# Patient Record
Sex: FEMALE | Race: WHITE
[De-identification: names, ages, dates, MRNs, and addresses within clinical notes are randomized per-mention and may not be internally consistent; named-entity substitution may affect disease eponyms.]

---

## 2022-08-04 ENCOUNTER — HOSPITAL ENCOUNTER (INPATIENT)
Dept: HOSPITAL 54 - ER | Age: 77
LOS: 20 days | Discharge: ANOTHER HOSPITAL | DRG: 377 | End: 2022-08-24
Attending: INTERNAL MEDICINE | Admitting: INTERNAL MEDICINE
Payer: COMMERCIAL

## 2022-08-04 VITALS — BODY MASS INDEX: 17.36 KG/M2 | HEIGHT: 66 IN | WEIGHT: 108 LBS

## 2022-08-04 VITALS — SYSTOLIC BLOOD PRESSURE: 108 MMHG | DIASTOLIC BLOOD PRESSURE: 60 MMHG

## 2022-08-04 DIAGNOSIS — D68.9: ICD-10-CM

## 2022-08-04 DIAGNOSIS — K92.2: Primary | ICD-10-CM

## 2022-08-04 DIAGNOSIS — G30.9: ICD-10-CM

## 2022-08-04 DIAGNOSIS — E78.5: ICD-10-CM

## 2022-08-04 DIAGNOSIS — Z53.1: ICD-10-CM

## 2022-08-04 DIAGNOSIS — R78.89: ICD-10-CM

## 2022-08-04 DIAGNOSIS — I27.20: ICD-10-CM

## 2022-08-04 DIAGNOSIS — I95.9: ICD-10-CM

## 2022-08-04 DIAGNOSIS — I10: ICD-10-CM

## 2022-08-04 DIAGNOSIS — N83.9: ICD-10-CM

## 2022-08-04 DIAGNOSIS — I26.99: ICD-10-CM

## 2022-08-04 DIAGNOSIS — Z79.899: ICD-10-CM

## 2022-08-04 DIAGNOSIS — D50.9: ICD-10-CM

## 2022-08-04 DIAGNOSIS — E87.6: ICD-10-CM

## 2022-08-04 DIAGNOSIS — K56.1: ICD-10-CM

## 2022-08-04 DIAGNOSIS — Z20.822: ICD-10-CM

## 2022-08-04 DIAGNOSIS — E88.09: ICD-10-CM

## 2022-08-04 DIAGNOSIS — E43: ICD-10-CM

## 2022-08-04 DIAGNOSIS — D63.8: ICD-10-CM

## 2022-08-04 DIAGNOSIS — F02.80: ICD-10-CM

## 2022-08-04 DIAGNOSIS — I48.20: ICD-10-CM

## 2022-08-04 DIAGNOSIS — N17.0: ICD-10-CM

## 2022-08-04 DIAGNOSIS — E87.0: ICD-10-CM

## 2022-08-04 DIAGNOSIS — E87.5: ICD-10-CM

## 2022-08-04 LAB
ALBUMIN SERPL BCP-MCNC: 2.9 G/DL (ref 3.4–5)
ALP SERPL-CCNC: 102 U/L (ref 46–116)
ALT SERPL W P-5'-P-CCNC: 15 U/L (ref 12–78)
AST SERPL W P-5'-P-CCNC: 11 U/L (ref 15–37)
BASOPHILS # BLD AUTO: 0 K/UL (ref 0–0.2)
BASOPHILS NFR BLD AUTO: 0.5 % (ref 0–2)
BILIRUB DIRECT SERPL-MCNC: 0.2 MG/DL (ref 0–0.2)
BILIRUB SERPL-MCNC: 0.5 MG/DL (ref 0.2–1)
BUN SERPL-MCNC: 36 MG/DL (ref 7–18)
CALCIUM SERPL-MCNC: 7.9 MG/DL (ref 8.5–10.1)
CHLORIDE SERPL-SCNC: 101 MMOL/L (ref 98–107)
CO2 SERPL-SCNC: 27 MMOL/L (ref 21–32)
CREAT SERPL-MCNC: 1.6 MG/DL (ref 0.6–1.3)
EOSINOPHIL NFR BLD AUTO: 0.2 % (ref 0–6)
FERRITIN SERPL-MCNC: 6 NG/ML (ref 8–388)
GLUCOSE SERPL-MCNC: 117 MG/DL (ref 74–106)
HCT VFR BLD AUTO: 16 % (ref 33–45)
HGB BLD-MCNC: 4.7 G/DL (ref 11.5–14.8)
IRON SERPL-MCNC: 21 UG/DL (ref 50–175)
LYMPHOCYTES NFR BLD AUTO: 2 K/UL (ref 0.8–4.8)
LYMPHOCYTES NFR BLD AUTO: 35.1 % (ref 20–44)
LYMPHOCYTES NFR BLD MANUAL: 13 % (ref 16–48)
MCHC RBC AUTO-ENTMCNC: 30 G/DL (ref 31–36)
MCV RBC AUTO: 56 FL (ref 82–100)
MONOCYTES NFR BLD AUTO: 0.4 K/UL (ref 0.1–1.3)
MONOCYTES NFR BLD AUTO: 7 % (ref 2–12)
MONOCYTES NFR BLD MANUAL: 2 % (ref 0–11)
NEUTROPHILS # BLD AUTO: 3.3 K/UL (ref 1.8–8.9)
NEUTROPHILS NFR BLD AUTO: 57.2 % (ref 43–81)
NEUTS SEG NFR BLD MANUAL: 85 % (ref 42–76)
PLATELET # BLD AUTO: 439 K/UL (ref 150–450)
POTASSIUM SERPL-SCNC: 2.3 MMOL/L (ref 3.5–5.1)
PROT SERPL-MCNC: 6.5 G/DL (ref 6.4–8.2)
RBC # BLD AUTO: 2.79 MIL/UL (ref 4–5.2)
SODIUM SERPL-SCNC: 137 MMOL/L (ref 136–145)
TIBC SERPL-MCNC: 307 UG/DL (ref 250–450)
WBC NRBC COR # BLD AUTO: 5.7 K/UL (ref 4.3–11)

## 2022-08-04 PROCEDURE — C9803 HOPD COVID-19 SPEC COLLECT: HCPCS

## 2022-08-04 PROCEDURE — G0378 HOSPITAL OBSERVATION PER HR: HCPCS

## 2022-08-04 RX ADMIN — SODIUM CHLORIDE SCH MLS/HR: 9 INJECTION, SOLUTION INTRAVENOUS at 14:00

## 2022-08-04 NOTE — NUR
RN ADMITTING NOTE



PATIENT ADMITTED FROM ER FOR SEVERE ANEMIA IN -2. PATIENT ON RA AT THIS TIME, 
TOLERATING WELL 100% O2 SAT. PATIENT IS ON TELE MONITOR READING CONTROLLED AFIB 88 BPM. 
PATIENT BROUGHT UP WITH IV FERRLECIT HOOKED ON THE PATIENT. R WRIST 18G PATENT AND INTACT 
WRAPPED IN KERLIX TO PREVENT PATIENT FROM PULLING OUT. PATIENT IS IRRITABLE. FOLLOWS 
COMMANDS, A/O X 1-2. PATIENT IS A POOR HISTORIAN. PER PATIENT, SHE IS Jain. 
SKIN ISSUES DOCUMENTED. BELONGINGS INVENTORIED. ORIENTED PATIENT TO ROOM, RN, AND CNA. 
SAFETY MEASURES IN PLACE: BED LOCKED AND IN LOWEST POSITION, CALL LIGHT WITHIN REACH, SIDE 
RAILS UP. BED ALARM ON. WILL MONITOR PATIENT CLOSELY.

## 2022-08-04 NOTE — NUR
RN NOTE



CALLED BROTHER CLAUDIA JAUREGUI ([phone]) TO OBTAIN ADMISSION INFORMATION. PER BROTHER, HE 
IS THE DPOA AND FOR MEDICAL AS WELL. STATES THAT HE HAS LEGAL DOCUMENTATION THAT HE 
SUBMITTED TO [facility]. CONFIRMED CODE STATUS AS WELL WISHES PATIENT TO BE FULL CODE.

## 2022-08-04 NOTE — NUR
RN NOTE



CALLED [facility] TO INFORM THEM TO FAX NEEDED DOCUMENTS, NO ANSWER. LEFT A VOICEMAIL AND CALL 
BACK NUMBER

## 2022-08-05 VITALS — DIASTOLIC BLOOD PRESSURE: 42 MMHG | SYSTOLIC BLOOD PRESSURE: 103 MMHG

## 2022-08-05 VITALS — DIASTOLIC BLOOD PRESSURE: 50 MMHG | SYSTOLIC BLOOD PRESSURE: 97 MMHG

## 2022-08-05 VITALS — SYSTOLIC BLOOD PRESSURE: 103 MMHG | DIASTOLIC BLOOD PRESSURE: 42 MMHG

## 2022-08-05 VITALS — SYSTOLIC BLOOD PRESSURE: 110 MMHG | DIASTOLIC BLOOD PRESSURE: 52 MMHG

## 2022-08-05 LAB
ALBUMIN SERPL BCP-MCNC: 2.8 G/DL (ref 3.4–5)
ALP SERPL-CCNC: 99 U/L (ref 46–116)
ALT SERPL W P-5'-P-CCNC: 14 U/L (ref 12–78)
AST SERPL W P-5'-P-CCNC: 12 U/L (ref 15–37)
BASOPHILS NFR BLD MANUAL: 0 % (ref 0–2)
BILIRUB SERPL-MCNC: 0.4 MG/DL (ref 0.2–1)
BUN SERPL-MCNC: 33 MG/DL (ref 7–18)
CALCIUM SERPL-MCNC: 8 MG/DL (ref 8.5–10.1)
CHLORIDE SERPL-SCNC: 103 MMOL/L (ref 98–107)
CO2 SERPL-SCNC: 29 MMOL/L (ref 21–32)
CREAT SERPL-MCNC: 1.4 MG/DL (ref 0.6–1.3)
EOSINOPHIL NFR BLD MANUAL: 1 % (ref 0–4)
GLUCOSE SERPL-MCNC: 97 MG/DL (ref 74–106)
HCT VFR BLD AUTO: 15 % (ref 33–45)
HGB BLD-MCNC: 4.5 G/DL (ref 11.5–14.8)
LYMPHOCYTES NFR BLD MANUAL: 23 % (ref 16–48)
MCHC RBC AUTO-ENTMCNC: 30 G/DL (ref 31–36)
MCV RBC AUTO: 56 FL (ref 82–100)
MONOCYTES NFR BLD MANUAL: 6 % (ref 0–11)
NEUTS SEG NFR BLD MANUAL: 70 % (ref 42–76)
PLATELET # BLD AUTO: 412 K/UL (ref 150–450)
POTASSIUM SERPL-SCNC: 2.3 MMOL/L (ref 3.5–5.1)
PROT SERPL-MCNC: 6.2 G/DL (ref 6.4–8.2)
RBC # BLD AUTO: 2.68 MIL/UL (ref 4–5.2)
SODIUM SERPL-SCNC: 140 MMOL/L (ref 136–145)
TSH SERPL DL<=0.005 MIU/L-ACNC: 0.92 UIU/ML (ref 0.36–3.74)
WBC NRBC COR # BLD AUTO: 5.3 K/UL (ref 4.3–11)

## 2022-08-05 RX ADMIN — POTASSIUM CHLORIDE SCH MEQ: 1500 TABLET, EXTENDED RELEASE ORAL at 11:17

## 2022-08-05 RX ADMIN — MEGESTROL ACETATE SCH MG: 400 SUSPENSION ORAL at 09:25

## 2022-08-05 RX ADMIN — Medication SCH MG: at 22:09

## 2022-08-05 RX ADMIN — POTASSIUM CHLORIDE SCH MEQ: 1500 TABLET, EXTENDED RELEASE ORAL at 12:57

## 2022-08-05 RX ADMIN — Medication SCH TAB: at 09:25

## 2022-08-05 RX ADMIN — METOPROLOL SUCCINATE SCH MG: 50 TABLET, EXTENDED RELEASE ORAL at 09:00

## 2022-08-05 RX ADMIN — Medication SCH ML: at 09:33

## 2022-08-05 RX ADMIN — ATORVASTATIN CALCIUM SCH MG: 40 TABLET, FILM COATED ORAL at 22:08

## 2022-08-05 RX ADMIN — POTASSIUM CHLORIDE SCH MEQ: 1500 TABLET, EXTENDED RELEASE ORAL at 20:54

## 2022-08-05 RX ADMIN — SODIUM CHLORIDE SCH MLS/HR: 9 INJECTION, SOLUTION INTRAVENOUS at 13:50

## 2022-08-05 RX ADMIN — POTASSIUM CHLORIDE SCH MEQ: 1500 TABLET, EXTENDED RELEASE ORAL at 16:24

## 2022-08-05 RX ADMIN — MEGESTROL ACETATE SCH MG: 400 SUSPENSION ORAL at 16:24

## 2022-08-05 RX ADMIN — Medication SCH ML: at 16:24

## 2022-08-05 NOTE — NUR
RN CLOSING NOTE



PATIENT IN BED, EYES CLOSED, EASILY AWAKENED. PATIENT IS A/O X 1-2 STILL. NO PAIN OR 
DISCOMFORT AT THIS TIME. TELE MONITOR READS CONTROLLED AFIB 98 BPM. RFA 22G PATENT AND 
INTACT. ORDERED EKG DT PATIENT NOT HAVING INITIAL EKG IN ER. STILL AWAITING CALL BACK FROM 
[facility]. SAFETY MEASURES IMPLEMENTED. NO SIGNIFICANT CHANGES DURING THE SHIFT. ALL NEEDS 
MET AND ATTENDED. ALL ORDERS CARRIED OUT. WILL ENDORSE TO DAY SHIFT NURSE TO [facility].

## 2022-08-05 NOTE — NUR
PATIENT PULLED OUT R WRIST 18 G, INSERTED A NEW IV ACCESS ON R FA 22G PATENT AND INTACT. 
WRAPPED IN KERLIX WELL TO PREVENT PATIENT FROM PULLING OUT PIV

## 2022-08-05 NOTE — NUR
TELE RN NOTES

RECEIVED ON BED A/OX1-2,BREATHING EASY,NO SOB,TELE,UNCONTROLLED AFIB 123,ASYMPTOMATIC,DENIES 
CHEST PAIN,COMMENTED FEELING WEAK,WITH H/H OF 4.5/15 THIS MORNING,NO BLOOD TRANSFUSION DUE 
TO JEHOVAHS WITNESS,ON FERLICCET IV DAILY.WITH SALINE LOCK RFA INTACT AND PATENT.FALL 
PRECAUTION OBSERVED,BED ON LOWEST POSITION AND LOCKED,BED ALAR,CALL LIGHT IN REACH,NEEDS 
ANTICIPATED.

## 2022-08-05 NOTE — NUR
TELE RN NOTES

STILL UNCONTROLLED AFIB THIS TIME, ON STAT EKG,CHARGE NURSE AWARE,MD VILLALBA MADE AWARE 
AWAITING TO CALL BACK

## 2022-08-05 NOTE — NUR
RN NOTE



NOTIFIED IN [facility] THAT EPOGEN 20,000 UNIT DOSE NOT GIVEN AS THERE WAS ONLY 10,000 
UNIT AVAILABLE. VIAL GIVEN BACK TO NURSING SUPERVISOR. PER PHARMACY, IT WILL BE IN MULTIPLE 
5,000 UNIT VIALS AND WILL BE DELIVERED SOON.

## 2022-08-05 NOTE — NUR
TELE RN CLOSING NOTES



PATIENT LAYING IN BED, A/O X 1, TOLERATING WELL ON ROOM AIR WITH NO S/S RESPIRATORY 
DISTRESS. NO COMPLAINTS OF PAIN OR DISCOMFORT AT THIS TIME. TELE MONITOR IN PLACE READING 
A-FIB . RFA # 22 G SL CLEAN, INTACT, AND FLUSHING WELL. SAFETY MEASURES IN PLACE: BED IN 
LOWEST LOCKED POSITION, SIDE RAILS UP X 2, CALL LIGHT WITHIN REACH. ALL NEEDS MET. TURNED 
Q2H DURING SHIFT. WILL ENDORSE TO NIGHT SHIFT FOR MADELYN.

## 2022-08-05 NOTE — NUR
TELE RN OPENING NOTE



PATIENT LAYING IN BED, A/O X 1, TOLERATING WELL ON ROOM AIR WITH NO S/S RESPIRATORY 
DISTRESS. NO COMPLAINTS OF PAIN OR DISCOMFORT AT THIS TIME. TELE MONITOR IN PLACE READING 
A-FIB 80. RFA # 22 G SL CLEAN, INTACT, AND FLUSHING WELL. SAFETY MEASURES IN PLACE: BED IN 
LOWEST LOCKED POSITION, SIDE RAILS UP X 2, CALL LIGHT WITHIN REACH. WILL CONTINUE TO 
MONITOR.

## 2022-08-05 NOTE — NUR
TELE RN NOTES



[facility] (FACILITY PATIENT WAS TRANSFERRED FROM) WAS CALLED AND VOICEMAIL LEFT X 2 
TO REQUEST PREVIOUS MEDICATION LIST. FAX NUMBER AND PHONE NUMBER FOR 3 [facility] WERE LEFT IN 
VOICEMAIL MESSAGES.

## 2022-08-05 NOTE — NUR
TELE RN NOTES

DR VILLALBA CALLED BACK WITH NEW ORDER OF DIGOXIN 0.25MG IV X1 NOTED AND CARRIED OUT.

## 2022-08-06 VITALS — SYSTOLIC BLOOD PRESSURE: 115 MMHG | DIASTOLIC BLOOD PRESSURE: 69 MMHG

## 2022-08-06 VITALS — DIASTOLIC BLOOD PRESSURE: 42 MMHG | SYSTOLIC BLOOD PRESSURE: 97 MMHG

## 2022-08-06 VITALS — DIASTOLIC BLOOD PRESSURE: 48 MMHG | SYSTOLIC BLOOD PRESSURE: 106 MMHG

## 2022-08-06 VITALS — DIASTOLIC BLOOD PRESSURE: 55 MMHG | SYSTOLIC BLOOD PRESSURE: 109 MMHG

## 2022-08-06 VITALS — SYSTOLIC BLOOD PRESSURE: 104 MMHG | DIASTOLIC BLOOD PRESSURE: 76 MMHG

## 2022-08-06 LAB
BASOPHILS NFR BLD MANUAL: 0 % (ref 0–2)
BUN SERPL-MCNC: 21 MG/DL (ref 7–18)
CALCIUM SERPL-MCNC: 8.4 MG/DL (ref 8.5–10.1)
CHLORIDE SERPL-SCNC: 107 MMOL/L (ref 98–107)
CHOLEST SERPL-MCNC: 106 MG/DL (ref ?–200)
CO2 SERPL-SCNC: 21 MMOL/L (ref 21–32)
CREAT SERPL-MCNC: 1 MG/DL (ref 0.6–1.3)
EOSINOPHIL NFR BLD MANUAL: 0 % (ref 0–4)
FERRITIN SERPL-MCNC: 195 NG/ML (ref 8–388)
GLUCOSE SERPL-MCNC: 93 MG/DL (ref 74–106)
HCT VFR BLD AUTO: 16 % (ref 33–45)
HDLC SERPL-MCNC: 42 MG/DL (ref 40–60)
HEMOCCULT STL QL: POSITIVE
HGB BLD-MCNC: 4.6 G/DL (ref 11.5–14.8)
IRON SERPL-MCNC: 110 UG/DL (ref 50–175)
LDLC SERPL DIRECT ASSAY-MCNC: 51 MG/DL (ref 0–99)
LYMPHOCYTES NFR BLD MANUAL: 19 % (ref 16–48)
MCHC RBC AUTO-ENTMCNC: 29 G/DL (ref 31–36)
MCV RBC AUTO: 60 FL (ref 82–100)
MONOCYTES NFR BLD MANUAL: 6 % (ref 0–11)
NEUTS SEG NFR BLD MANUAL: 75 % (ref 42–76)
PLATELET # BLD AUTO: 402 K/UL (ref 150–450)
POTASSIUM SERPL-SCNC: 5.6 MMOL/L (ref 3.5–5.1)
RBC # BLD AUTO: 2.64 MIL/UL (ref 4–5.2)
SODIUM SERPL-SCNC: 137 MMOL/L (ref 136–145)
TIBC SERPL-MCNC: 298 UG/DL (ref 250–450)
TRIGL SERPL-MCNC: 46 MG/DL (ref 30–150)
TSH SERPL DL<=0.005 MIU/L-ACNC: 1.43 UIU/ML (ref 0.36–3.74)
WBC NRBC COR # BLD AUTO: 8.2 K/UL (ref 4.3–11)

## 2022-08-06 RX ADMIN — MEGESTROL ACETATE SCH MG: 400 SUSPENSION ORAL at 17:58

## 2022-08-06 RX ADMIN — Medication SCH ML: at 11:40

## 2022-08-06 RX ADMIN — POTASSIUM CHLORIDE SCH MEQ: 1500 TABLET, EXTENDED RELEASE ORAL at 04:59

## 2022-08-06 RX ADMIN — POTASSIUM CHLORIDE SCH MEQ: 1500 TABLET, EXTENDED RELEASE ORAL at 01:03

## 2022-08-06 RX ADMIN — Medication SCH ML: at 07:33

## 2022-08-06 RX ADMIN — Medication SCH ML: at 17:58

## 2022-08-06 RX ADMIN — POTASSIUM CHLORIDE SCH MEQ: 1500 TABLET, EXTENDED RELEASE ORAL at 09:15

## 2022-08-06 RX ADMIN — SODIUM CHLORIDE SCH MLS/HR: 9 INJECTION, SOLUTION INTRAVENOUS at 13:56

## 2022-08-06 RX ADMIN — ATORVASTATIN CALCIUM SCH MG: 40 TABLET, FILM COATED ORAL at 21:56

## 2022-08-06 RX ADMIN — Medication SCH TAB: at 09:16

## 2022-08-06 RX ADMIN — POTASSIUM CHLORIDE SCH MEQ: 1500 TABLET, EXTENDED RELEASE ORAL at 12:20

## 2022-08-06 RX ADMIN — MEGESTROL ACETATE SCH MG: 400 SUSPENSION ORAL at 09:15

## 2022-08-06 RX ADMIN — METOPROLOL SUCCINATE SCH MG: 50 TABLET, EXTENDED RELEASE ORAL at 09:00

## 2022-08-06 RX ADMIN — Medication SCH ML: at 09:17

## 2022-08-06 RX ADMIN — Medication SCH MG: at 21:57

## 2022-08-06 RX ADMIN — Medication SCH MG: at 09:15

## 2022-08-06 NOTE — NUR
TELE RN NOTES



SPOKE WITH DR MAKENNA FERNANDEZ WHO WAS COVERING FOR DR VILLALBA. DR JACOBS STATED PATIENT DOES 
NOT NEED CARDIAC CONSULT.

## 2022-08-06 NOTE — NUR
TELE RN NOTES

STILL UNCONTROLLED AFIB,RATE ,DENIES ANY CHEST DISCOMFORTS. WILL COME LATER 
FOR MORNING LAB DRAW.MEPILEX APPLIED TO SACRAL AREA.ALL DUE MEDS ADMINISTERED.CALL LIGHT IN 
REACH,NEEDS ATTENDED.

----------------------------------------------------------------

## 2022-08-06 NOTE — NUR
TELE RN CLOSING NOTES



PATIENT LAYING IN BED, A/O X 1, TOLERATING WELL ON ROOM AIR WITH NO S/S RESPIRATORY 
DISTRESS. NO COMPLAINTS OF PAIN OR DISCOMFORT AT THIS TIME. TELE MONITOR IN PLACE READING 
A-FIB. R FA # 22 SL CLEAN, INTACT, AND FLUSHING WELL. SAFETY MEASURES IN PLACE: BED IN 
LOWEST LOCKED POSITION, SIDE RAILS UP X 2, CALL LIGHT WITHIN REACH. ALL NEEDS MET. ATTENDING 
MD WAS MADE AWARE OF PATIENT A-FIB DURING SHIFT, BUT NO CARDIAC CONSULT WAS RECOMMENDED. 
PATIENT TURNED Q2H. WILL ENDORSE TO NIGHT SHIFT FOR MADELYN.

## 2022-08-06 NOTE — NUR
TELE RN OPENING NOTES



PATIENT LAYING IN BED, A/O X 1, TOLERATING WELL ON ROOM AIR WITH NO S/S RESPIRATORY 
DISTRESS. NO COMPLAINTS OF PAIN OR DISCOMFORT AT THIS TIME. TELE MONITOR IN PLACE READING 
A-. R FA # 22 SL CLEAN, INTACT, AND FLUSHING WELL. SAFETY MEASURES IN PLACE: BED IN 
LOWEST LOCKED POSITION, SIDE RAILS UP X 2, CALL LIGHT WITHIN REACH. WILL CONTINUE TO 
MONITOR.

## 2022-08-06 NOTE — NUR
TELE RN NOTES

RECEIVED LAYING COMFORTABLY ON BED,BREATHING EASY,NO SOB,STILL UNCONTROLLED AFIB ON TELE 
MONITOR 114,ON DIGOXIN 0.125 PO DAILY.SALINE LOCK RIGHT FOREARM INTACT AND PATENT.H/H STILL 
LOW AT 4.6/16,POSITIVE FOR OCCULT BLOOD.CALL LIGHT IN REACH,NEEDS ANTICIPATED.

## 2022-08-07 VITALS — DIASTOLIC BLOOD PRESSURE: 55 MMHG | SYSTOLIC BLOOD PRESSURE: 110 MMHG

## 2022-08-07 VITALS — DIASTOLIC BLOOD PRESSURE: 51 MMHG | SYSTOLIC BLOOD PRESSURE: 106 MMHG

## 2022-08-07 VITALS — DIASTOLIC BLOOD PRESSURE: 64 MMHG | SYSTOLIC BLOOD PRESSURE: 118 MMHG

## 2022-08-07 VITALS — SYSTOLIC BLOOD PRESSURE: 92 MMHG | DIASTOLIC BLOOD PRESSURE: 48 MMHG

## 2022-08-07 LAB
ALBUMIN SERPL BCP-MCNC: 2.8 G/DL (ref 3.4–5)
ALP SERPL-CCNC: 101 U/L (ref 46–116)
ALT SERPL W P-5'-P-CCNC: 16 U/L (ref 12–78)
AST SERPL W P-5'-P-CCNC: 14 U/L (ref 15–37)
BASOPHILS # BLD AUTO: 0 K/UL (ref 0–0.2)
BASOPHILS NFR BLD AUTO: 0.5 % (ref 0–2)
BASOPHILS NFR BLD MANUAL: 0 % (ref 0–2)
BILIRUB SERPL-MCNC: 0.5 MG/DL (ref 0.2–1)
BUN SERPL-MCNC: 19 MG/DL (ref 7–18)
CALCIUM SERPL-MCNC: 8.6 MG/DL (ref 8.5–10.1)
CANCER AG125 SERPL-ACNC: 39.1 U/ML (ref 0–38.1)
CANCER AG19-9 SERPL-ACNC: 80 U/ML (ref 0–35)
CHLORIDE SERPL-SCNC: 114 MMOL/L (ref 98–107)
CO2 SERPL-SCNC: 22 MMOL/L (ref 21–32)
CREAT SERPL-MCNC: 1.1 MG/DL (ref 0.6–1.3)
EOSINOPHIL NFR BLD AUTO: 0.3 % (ref 0–6)
EOSINOPHIL NFR BLD MANUAL: 0 % (ref 0–4)
GLUCOSE SERPL-MCNC: 97 MG/DL (ref 74–106)
HCT VFR BLD AUTO: 15 % (ref 33–45)
HGB BLD-MCNC: 4.2 G/DL (ref 11.5–14.8)
LYMPHOCYTES NFR BLD AUTO: 1.3 K/UL (ref 0.8–4.8)
LYMPHOCYTES NFR BLD AUTO: 18.6 % (ref 20–44)
LYMPHOCYTES NFR BLD MANUAL: 22 % (ref 16–48)
MAGNESIUM SERPL-MCNC: 2.7 MG/DL (ref 1.8–2.4)
MCHC RBC AUTO-ENTMCNC: 29 G/DL (ref 31–36)
MCV RBC AUTO: 59 FL (ref 82–100)
MONOCYTES NFR BLD AUTO: 0.7 K/UL (ref 0.1–1.3)
MONOCYTES NFR BLD AUTO: 10.7 % (ref 2–12)
MONOCYTES NFR BLD MANUAL: 6 % (ref 0–11)
NEUTROPHILS # BLD AUTO: 4.9 K/UL (ref 1.8–8.9)
NEUTROPHILS NFR BLD AUTO: 69.9 % (ref 43–81)
NEUTS SEG NFR BLD MANUAL: 72 % (ref 42–76)
PHOSPHATE SERPL-MCNC: 1.5 MG/DL (ref 2.5–4.9)
PLATELET # BLD AUTO: 382 K/UL (ref 150–450)
POTASSIUM SERPL-SCNC: 5.5 MMOL/L (ref 3.5–5.1)
PROT SERPL-MCNC: 6.2 G/DL (ref 6.4–8.2)
RBC # BLD AUTO: 2.52 MIL/UL (ref 4–5.2)
SODIUM SERPL-SCNC: 142 MMOL/L (ref 136–145)
WBC NRBC COR # BLD AUTO: 7 K/UL (ref 4.3–11)

## 2022-08-07 PROCEDURE — 05H933Z INSERTION OF INFUSION DEVICE INTO RIGHT BRACHIAL VEIN, PERCUTANEOUS APPROACH: ICD-10-PCS | Performed by: NURSE PRACTITIONER

## 2022-08-07 RX ADMIN — SODIUM POLYSTYRENE SULFONATE SCH GM: 1 POWDER ORAL; RECTAL at 22:00

## 2022-08-07 RX ADMIN — METOPROLOL SUCCINATE SCH MG: 50 TABLET, EXTENDED RELEASE ORAL at 08:27

## 2022-08-07 RX ADMIN — Medication SCH TAB: at 08:26

## 2022-08-07 RX ADMIN — Medication SCH MG: at 21:28

## 2022-08-07 RX ADMIN — MEGESTROL ACETATE SCH MG: 400 SUSPENSION ORAL at 17:04

## 2022-08-07 RX ADMIN — SODIUM CHLORIDE SCH MLS/HR: 9 INJECTION, SOLUTION INTRAVENOUS at 14:53

## 2022-08-07 RX ADMIN — Medication SCH ML: at 12:06

## 2022-08-07 RX ADMIN — Medication SCH PACKET: at 17:04

## 2022-08-07 RX ADMIN — Medication SCH MG: at 08:27

## 2022-08-07 RX ADMIN — MEGESTROL ACETATE SCH MG: 400 SUSPENSION ORAL at 08:27

## 2022-08-07 RX ADMIN — Medication SCH PACKET: at 09:01

## 2022-08-07 RX ADMIN — Medication SCH ML: at 07:50

## 2022-08-07 RX ADMIN — Medication SCH ML: at 08:27

## 2022-08-07 RX ADMIN — Medication SCH ML: at 17:06

## 2022-08-07 NOTE — NUR
TELE RN NOTES

AFIB 102 THIS TIME.CALM AND QUIET ON BED,MED COMPLIANT,VITAL SIGNS STABLE,IN NO ACUTE 
DISTRESS.

## 2022-08-07 NOTE — NUR
RN NOTE



PT EDUCATED ON LOW HEMOGLOBIN AND HEMATOCRIT AND THE HIGH RISK AND POSSIBLE COMPLICATIONS 
ASSOCIATED WITH REFUSING PRBC TRANSFUSION. PT VERBALIZES UNDERSTANDING AND CONTINUES TO 
REFUSE BLOOD TRANSFUSION. PT VERBALIZES DESIRES TO REMAIN FULL CODE AT THIS TIME.

## 2022-08-07 NOTE — NUR
RN NOTE



IV INFILTRATED. PT HARD STICK, UNABLE TO PLACE NEW ONE. REQUESTED MIDLINE PLACEMENT FROM 
NURSING SUP.

## 2022-08-07 NOTE — NUR
RN NOTE



CRITICAL LAB VALUE OF HEMOGLOBIN 4.2 AND HEMATOCRIT 15 RECEIVED. LAB VALUE EXPECTED DUE TO 
PT REFUSING PRBC TRANSFUSION WITH A HX OF SEVERE ANEMIA.  AWARE OF PT'S REFUSAL OF 
TREATMENT AND OF CRITICAL LAB VALUES OVER PAST FEW DAYS.

## 2022-08-07 NOTE — NUR
TELE RN OPENING NOTE



RECEIVED PT RESTING IN BED. A/O X3 AND ABLE TO MAKE NEEDS KNOWN. PT STABLE ON ROOM AIR. NO 
SOB OR S/S OF RESPIRATORY DISTRESS. BREATHING EVEN AND UNLABORED. ON EXTERNAL CARDIAC 
MONITOR READING UNCONTROLLED AFIB @ 110 BPM. IV ACCESS SHANITA MIDLINE, INTACT AND PATENT, 
RUNNING NS @ 50 ML/HR. SAFETY PRECAUTIONS IN PLACE. BED IN LOWEST LOCKED POSITION, HOB 
ELEVATED, SIDE RAILS UP X3, AND CALL LIGHT AND TABLE WITHIN REACH. ALL NEEDS MET AT THIS 
TIME.

## 2022-08-08 VITALS — SYSTOLIC BLOOD PRESSURE: 108 MMHG | DIASTOLIC BLOOD PRESSURE: 42 MMHG

## 2022-08-08 VITALS — SYSTOLIC BLOOD PRESSURE: 107 MMHG | DIASTOLIC BLOOD PRESSURE: 60 MMHG

## 2022-08-08 VITALS — SYSTOLIC BLOOD PRESSURE: 107 MMHG | DIASTOLIC BLOOD PRESSURE: 61 MMHG

## 2022-08-08 VITALS — SYSTOLIC BLOOD PRESSURE: 97 MMHG | DIASTOLIC BLOOD PRESSURE: 55 MMHG

## 2022-08-08 VITALS — SYSTOLIC BLOOD PRESSURE: 120 MMHG | DIASTOLIC BLOOD PRESSURE: 52 MMHG

## 2022-08-08 VITALS — DIASTOLIC BLOOD PRESSURE: 60 MMHG | SYSTOLIC BLOOD PRESSURE: 115 MMHG

## 2022-08-08 VITALS — DIASTOLIC BLOOD PRESSURE: 62 MMHG | SYSTOLIC BLOOD PRESSURE: 104 MMHG

## 2022-08-08 RX ADMIN — Medication SCH ML: at 09:03

## 2022-08-08 RX ADMIN — Medication SCH MG: at 09:02

## 2022-08-08 RX ADMIN — Medication SCH ML: at 12:09

## 2022-08-08 RX ADMIN — SODIUM CHLORIDE SCH MLS/HR: 9 INJECTION, SOLUTION INTRAVENOUS at 14:34

## 2022-08-08 RX ADMIN — MEGESTROL ACETATE SCH MG: 400 SUSPENSION ORAL at 09:01

## 2022-08-08 RX ADMIN — METOPROLOL SUCCINATE SCH MG: 50 TABLET, EXTENDED RELEASE ORAL at 09:00

## 2022-08-08 RX ADMIN — Medication SCH ML: at 17:00

## 2022-08-08 RX ADMIN — MEGESTROL ACETATE SCH MG: 400 SUSPENSION ORAL at 17:29

## 2022-08-08 RX ADMIN — Medication SCH ML: at 11:45

## 2022-08-08 RX ADMIN — Medication SCH MG: at 21:34

## 2022-08-08 RX ADMIN — SODIUM POLYSTYRENE SULFONATE SCH GM: 1 POWDER ORAL; RECTAL at 10:00

## 2022-08-08 RX ADMIN — Medication SCH ML: at 08:02

## 2022-08-08 RX ADMIN — Medication SCH TAB: at 09:01

## 2022-08-08 RX ADMIN — Medication SCH PACKET: at 09:06

## 2022-08-08 RX ADMIN — SODIUM CHLORIDE PRN MLS/HR: 9 INJECTION, SOLUTION INTRAVENOUS at 10:39

## 2022-08-08 RX ADMIN — Medication SCH PACKET: at 17:29

## 2022-08-08 RX ADMIN — Medication SCH ML: at 17:29

## 2022-08-08 NOTE — NUR
TELE RN CLOSING NOTES:



PATIENT  IN BED AWAKE A/O X3. NO SOB OR CARDIAC DISTRESS NOTED. ON ROOM AIR AND TOLERATING 
WELL. ON CARDIAC MONITPOR WITH CURRENT READING OF CONTROLLED AFIB @88BPM. DENIES ANY PAIN AT 
THIS TIME. WITH IV ACCESS ON SHANITA MIDLINE NS @50ML/HR PATENT AND INTACT. ON CLEAR LIQUIDS. 
SAFETY PRECAUTIONS MAINTAINED: BED LOCKED AND IN LOWEST POSITION. SIDE RAILS UP X 2. CALL 
LIGHT IN EASY REACH. KEPT RESTED AND COMFORTABLE. ENDORSED TO NIGHT SHIFT FOR MADELYN.

## 2022-08-08 NOTE — NUR
RN NOTES:



BRIEFLY EXPLAINED TO PT THAT SHE WILL HAVE A PROCEDURE TOMORROW MORNING FOR COLONOSCOPY, SHE 
WILL HAVE CLEAR LIQUIDS AND WILL DRINK A BOWEL PREP AS ORDERED. OBTAINED CONSENT FOR THE 
PROCEDURE.

## 2022-08-08 NOTE — NUR
TELE RN CLOSING NOTE



PT RESTING IN BED. A/O X3 AND ABLE TO MAKE NEEDS KNOWN. PT STABLE ON ROOM AIR. NO SOB OR S/S 
OF RESPIRATORY DISTRESS. BREATHING EVEN AND UNLABORED. ON EXTERNAL CARDIAC MONITOR READING 
CONTROLLED AFIB @ 90 BPM. IV ACCESS SHANITA MIDLINE, INTACT AND PATENT, RUNNING NS @ 50 ML/HR. 
ALL DUE MEDS GIVEN AS ORDERED. TURNED AND REPOSITIONED Q2H. SAFETY PRECAUTIONS IN PLACE AT 
ALL TIMES. BED IN LOWEST LOCKED POSITION, HOB ELEVATED, SIDE RAILS UP X3, AND CALL LIGHT AND 
TABLE WITHIN REACH. ALL NEEDS MET AT THIS TIME AND WILL ENDORSE TO ONCOMING NURSE FOR MADELYN.

## 2022-08-08 NOTE — NUR
RN NOTES:



PATIENTLY ENCOURAGING PATIENT TO DRINK GOLYTLEY (BOWEL PREP). PATIENT ABLE TO DRINK 1 CUP.

## 2022-08-08 NOTE — NUR
TELE RN OPENING NOTES:



RECEIVED PT IN BED ASLEEP EASILY AROUSED WITH STIMULI.A/O X3. NO SOB OR CARDIAC DISTRESS 
NOTED. OM ROOM AIR AND TOLERATING WELL. ON CARDIAC MONITPOR WITH CURRENT READING OF AFIB 
@90BPM. DENIES ANY PAIN AT THIS TIME. WITH IV ACCESS ON SHANITA MIDLINE NS @50ML/HR PATENT AND 
INTACT. SAFETY PRECAUTIONS MAINTAINED: BED LOCKED AND IN LOWEST POSITION. SIDE RAILS UP X 2. 
CALL LIGHT IN EASY REACH. KEPT RESTED AND COMFORTABLE. WILL MONITOR ACCORDINGLY.

## 2022-08-08 NOTE — NUR
TELE RN OPENING NOTES:



PATIENT IN BED AWAKE A/O X3. NO SOB OR CARDIAC DISTRESS NOTED. ON ROOM AIR AND TOLERATING 
WELL. ON CARDIAC MONITOR WITH CURRENT READING OF CONTROLLED AFIB @88BPM. DENIES ANY PAIN AT 
THIS TIME. WITH IV ACCESS ON SHANITA MIDLINE NS @75ML/HR PATENT AND INTACT. ON CLEAR LIQUIDS. 
SAFETY PRECAUTIONS MAINTAINED PT TO BE NPO AFTER MIDNIGHT FOR COLONOSCOPY.BED LOCKED AND IN 
LOWEST POSITION. SIDE RAILS UP X 2. CALL LIGHT IN EASY REACH. KEPT RESTED AND 
COMFORTABLE.WILL CONTINUE TO MONITOR.

## 2022-08-09 VITALS — DIASTOLIC BLOOD PRESSURE: 57 MMHG | SYSTOLIC BLOOD PRESSURE: 112 MMHG

## 2022-08-09 VITALS — SYSTOLIC BLOOD PRESSURE: 112 MMHG | DIASTOLIC BLOOD PRESSURE: 58 MMHG

## 2022-08-09 VITALS — SYSTOLIC BLOOD PRESSURE: 115 MMHG | DIASTOLIC BLOOD PRESSURE: 54 MMHG

## 2022-08-09 VITALS — DIASTOLIC BLOOD PRESSURE: 59 MMHG | SYSTOLIC BLOOD PRESSURE: 115 MMHG

## 2022-08-09 VITALS — SYSTOLIC BLOOD PRESSURE: 109 MMHG | DIASTOLIC BLOOD PRESSURE: 57 MMHG

## 2022-08-09 VITALS — SYSTOLIC BLOOD PRESSURE: 113 MMHG | DIASTOLIC BLOOD PRESSURE: 58 MMHG

## 2022-08-09 LAB
BUN SERPL-MCNC: 13 MG/DL (ref 7–18)
CALCIUM SERPL-MCNC: 8.5 MG/DL (ref 8.5–10.1)
CHLORIDE SERPL-SCNC: 118 MMOL/L (ref 98–107)
CO2 SERPL-SCNC: 19 MMOL/L (ref 21–32)
CREAT SERPL-MCNC: 0.9 MG/DL (ref 0.6–1.3)
GLUCOSE SERPL-MCNC: 80 MG/DL (ref 74–106)
HCT VFR BLD AUTO: 16 % (ref 33–45)
HGB BLD-MCNC: 4.7 G/DL (ref 11.5–14.8)
LYMPHOCYTES NFR BLD MANUAL: 16 % (ref 16–48)
MCHC RBC AUTO-ENTMCNC: 29 G/DL (ref 31–36)
MCV RBC AUTO: 63 FL (ref 82–100)
MONOCYTES NFR BLD MANUAL: 5 % (ref 0–11)
NEUTS BAND NFR BLD MANUAL: 1 % (ref 0–5)
NEUTS SEG NFR BLD MANUAL: 78 % (ref 42–76)
PHOSPHATE SERPL-MCNC: 3.4 MG/DL (ref 2.5–4.9)
PLATELET # BLD AUTO: 364 K/UL (ref 150–450)
POTASSIUM SERPL-SCNC: 3.9 MMOL/L (ref 3.5–5.1)
RBC # BLD AUTO: 2.54 MIL/UL (ref 4–5.2)
SODIUM SERPL-SCNC: 151 MMOL/L (ref 136–145)
WBC NRBC COR # BLD AUTO: 7.3 K/UL (ref 4.3–11)

## 2022-08-09 RX ADMIN — METOPROLOL SUCCINATE SCH MG: 50 TABLET, EXTENDED RELEASE ORAL at 09:00

## 2022-08-09 RX ADMIN — MEGESTROL ACETATE SCH MG: 400 SUSPENSION ORAL at 09:50

## 2022-08-09 RX ADMIN — Medication SCH MG: at 21:05

## 2022-08-09 RX ADMIN — Medication SCH PACKET: at 09:00

## 2022-08-09 RX ADMIN — EPOETIN ALFA-EPBX SCH UNIT: 10000 INJECTION, SOLUTION INTRAVENOUS; SUBCUTANEOUS at 14:13

## 2022-08-09 RX ADMIN — Medication SCH MG: at 09:00

## 2022-08-09 RX ADMIN — SODIUM POLYSTYRENE SULFONATE SCH GM: 1 POWDER ORAL; RECTAL at 09:00

## 2022-08-09 RX ADMIN — Medication SCH TAB: at 09:00

## 2022-08-09 RX ADMIN — Medication SCH ML: at 08:00

## 2022-08-09 RX ADMIN — MEGESTROL ACETATE SCH MG: 400 SUSPENSION ORAL at 17:40

## 2022-08-09 RX ADMIN — Medication SCH ML: at 11:52

## 2022-08-09 RX ADMIN — Medication SCH ML: at 09:00

## 2022-08-09 RX ADMIN — Medication SCH PACKET: at 17:40

## 2022-08-09 RX ADMIN — Medication SCH ML: at 17:40

## 2022-08-09 NOTE — NUR
RN NOTES:

RECEIEVD AWAKE ON BED, PALE LOOKING, LOOKS WEAK, DRY LIPS AND ON TELE MONITOR A FIB RATE-91, 
A/OX1-2 WITH PERIODS OF CONFUSION, ORIENTED TO UNIT AND STAFF, NON LABORED BREATHING, ON 
ROOM AIR, INCONTINENT B/B, CONTINUE ON BOWEL PREPARATION FOR POSSIBOLE ENDOSCOPY, PER RN 
VERY POOR COMPLIANCE WITH ORAL INFUSION OF GOLITELY, , ON BED REST, SKIN INTACT, SHANITA MID 
LINE WITH IVF ON NS AT 50 ML/HR,.

-SAFETY AND ASPIRATION PRECAUTION OBSERVE.

-LATEST HG-4.7, NO BT SECONDARY TO [employer].

## 2022-08-09 NOTE — NUR
RN NOTES:



VISITOR AT BED SIDE AND WE ENCOURAGED PT TO DRINK THE BOWEL PREP X 3 AND PT STRONGLY 
REFUSED.

## 2022-08-09 NOTE — NUR
TELE RN CLOSING NOTES:



PATIENT IN BED .A/O X3. NO SOB OR CARDIAC DISTRESS NOTED. ON ROOM AIR AND TOLERATING WELL. 
ON CARDIAC MONITOR WITH CURRENT READING OF CONTROLLED AFIB WITH PVCs @ 96 BPM. DENIES ANY 
PAIN AT THIS TIME. WITH IV ACCESS ON SHANITA MIDLINE NS @50ML/HR PATENT AND INTACT. ON CLEAR 
LIQUID AS ORDERED.SAFETY PRECAUTIONS MAINTAINED: BED LOCKED AND IN LOWEST POSITION. SIDE 
RAILS UP X 2. CALL LIGHT IN EASY REACH. KEPT RESTED AND COMFORTABLE. ENDORSED TO NIGHT SHIFT 
NURSE FOR MADELYN.

## 2022-08-09 NOTE — NUR
RN NOTES:



DR VILLALBA MADE AWARE THAT PT IS NOT COMPLIANT ON DRINKING HER BOWEL PREP. GOT AN ORDER OF NGT 
INSERTION FROM DR VILLALBA. PATIENT WAS INFORMED AND VERBALLY  CONSENTED US TO DO NGT INSERTION.

## 2022-08-09 NOTE — NUR
RN NOTES:



RNS NATASHA AND THEO INITIATE DTO INSERT NGT BUT PT REFUSED TO GET NGT. DR VILLALBA MADE AWARE 
AND SAID OKAY AND PAGED DR GRANADOS, AWAITING FOR CALL BACK,.

## 2022-08-09 NOTE — NUR
RN NOTES:

CHECKED AT FREQUEST INTERVALS AND GIVEN HER DRINK, SHE WILL ONLY TAKE A SMALL SIPS, THEN SHE 
REFUSED, MEDICATION COMPLIANT BUT NOT ON HER ORAL BOWEL PREP, IVF CONTINUE.NEEDS ATTENDED.

## 2022-08-09 NOTE — NUR
TELE RN OPENING NOTES:



RECEIVED PT IN BED ASLEEP EASILY AROUSED WITH STIMULI.A/O X3. NO SOB OR CARDIAC DISTRESS 
NOTED. OM ROOM AIR AND TOLERATING WELL. ON CARDIAC MONITOR WITH CURRENT READING OF 
CONTROLLED AFIB @88 BPM. DENIES ANY PAIN AT THIS TIME. WITH IV ACCESS ON SHANITA MIDLINE NS 
@50ML/HR PATENT AND INTACT. ON NPO AS ORDERED.SAFETY PRECAUTIONS MAINTAINED: BED LOCKED AND 
IN LOWEST POSITION. SIDE RAILS UP X 2. CALL LIGHT IN EASY REACH. KEPT RESTED AND 
COMFORTABLE. WILL MONITOR ACCORDINGLY.

## 2022-08-09 NOTE — NUR
TELE RN CLOSING NOTES:



PATIENT IN BED AWAKE A/O X3. NO SOB OR CARDIAC DISTRESS NOTED. ON ROOM AIR AND TOLERATING 
WELL. ON CARDIAC MONITOR WITH CURRENT READING OF CONTROLLED AFIB. DENIES ANY PAIN AT THIS 
TIME. WITH IV ACCESS ON SHANITA MIDLINE NS @75ML/HR PATENT AND INTACT. SAFETY PRECAUTIONS 
MAINTAINED PT NPO FOR COLONOSCOPY HOWEVER PT REFUSED TO TAKE PREP MULTIPLE TIMES THROUGHOUT 
THE NIGHT .BED LOCKED AND IN LOWEST POSITION. SIDE RAILS UP X 2. CALL LIGHT IN EASY REACH. 
KEPT RESTED AND COMFORTABLE.WILL ENDORSE CARE.

## 2022-08-10 VITALS — DIASTOLIC BLOOD PRESSURE: 69 MMHG | SYSTOLIC BLOOD PRESSURE: 116 MMHG

## 2022-08-10 VITALS — DIASTOLIC BLOOD PRESSURE: 57 MMHG | SYSTOLIC BLOOD PRESSURE: 119 MMHG

## 2022-08-10 VITALS — DIASTOLIC BLOOD PRESSURE: 54 MMHG | SYSTOLIC BLOOD PRESSURE: 110 MMHG

## 2022-08-10 VITALS — SYSTOLIC BLOOD PRESSURE: 121 MMHG | DIASTOLIC BLOOD PRESSURE: 57 MMHG

## 2022-08-10 VITALS — DIASTOLIC BLOOD PRESSURE: 51 MMHG | SYSTOLIC BLOOD PRESSURE: 111 MMHG

## 2022-08-10 VITALS — DIASTOLIC BLOOD PRESSURE: 59 MMHG | SYSTOLIC BLOOD PRESSURE: 123 MMHG

## 2022-08-10 LAB
BUN SERPL-MCNC: 13 MG/DL (ref 7–18)
CALCIUM SERPL-MCNC: 8.3 MG/DL (ref 8.5–10.1)
CHLORIDE SERPL-SCNC: 119 MMOL/L (ref 98–107)
CO2 SERPL-SCNC: 18 MMOL/L (ref 21–32)
CREAT SERPL-MCNC: 1 MG/DL (ref 0.6–1.3)
GLUCOSE SERPL-MCNC: 84 MG/DL (ref 74–106)
HCT VFR BLD AUTO: 15 % (ref 33–45)
HGB BLD-MCNC: 4.4 G/DL (ref 11.5–14.8)
LYMPHOCYTES NFR BLD MANUAL: 12 % (ref 16–48)
MCHC RBC AUTO-ENTMCNC: 29 G/DL (ref 31–36)
MCV RBC AUTO: 64 FL (ref 82–100)
MONOCYTES NFR BLD MANUAL: 5 % (ref 0–11)
NEUTS SEG NFR BLD MANUAL: 83 % (ref 42–76)
PLATELET # BLD AUTO: 352 K/UL (ref 150–450)
POTASSIUM SERPL-SCNC: 3.4 MMOL/L (ref 3.5–5.1)
RBC # BLD AUTO: 2.39 MIL/UL (ref 4–5.2)
SODIUM SERPL-SCNC: 154 MMOL/L (ref 136–145)
WBC NRBC COR # BLD AUTO: 7.5 K/UL (ref 4.3–11)

## 2022-08-10 RX ADMIN — Medication SCH ML: at 08:00

## 2022-08-10 RX ADMIN — EPOETIN ALFA-EPBX SCH UNIT: 10000 INJECTION, SOLUTION INTRAVENOUS; SUBCUTANEOUS at 14:59

## 2022-08-10 RX ADMIN — SODIUM CHLORIDE PRN MLS/HR: 9 INJECTION, SOLUTION INTRAVENOUS at 04:34

## 2022-08-10 RX ADMIN — Medication SCH ML: at 08:23

## 2022-08-10 RX ADMIN — Medication SCH TAB: at 08:23

## 2022-08-10 RX ADMIN — Medication SCH ML: at 11:36

## 2022-08-10 RX ADMIN — Medication SCH ML: at 16:22

## 2022-08-10 RX ADMIN — METOPROLOL SUCCINATE SCH MG: 50 TABLET, EXTENDED RELEASE ORAL at 08:23

## 2022-08-10 RX ADMIN — MEGESTROL ACETATE SCH MG: 400 SUSPENSION ORAL at 08:23

## 2022-08-10 RX ADMIN — Medication SCH PACKET: at 16:27

## 2022-08-10 RX ADMIN — Medication SCH PACKET: at 08:23

## 2022-08-10 RX ADMIN — Medication SCH MG: at 08:22

## 2022-08-10 RX ADMIN — MEGESTROL ACETATE SCH MG: 400 SUSPENSION ORAL at 16:26

## 2022-08-10 RX ADMIN — Medication SCH MG: at 21:46

## 2022-08-10 NOTE — NUR
TELE RN OPENING NOTE



RECEIVED PT ASLEEP IN BED, EASILY AROUSED. A/O X1-2, REORIENTED PT AS NEEDED. ON RA, 
TOLERATING WELL. NO SOB NOTED. NOT IN ANY SIGN OF RESPIRATORY DISTRESS. ON CARDIAC TELE 
MONITOR WITH CURRENT READING OF AFIB CONTROLLED, HR 82. NO C/O CARDIAC DISTRESS VOICED AT 
THIS TIME. IV ACCESS IN SHANITA MIDLINE INTACT, AND PATENT WITH NS INFUSING AT 50ML/HR. SAFETY 
MEASURES IN PLACE: BED IN LOWEST AND LOCKED POSITION, SIDE RAILS UPX2, BED ALARM ON AND CALL 
LIGHT WITHIN REACH. WILL CONTINUE TO MONITOR PT.

## 2022-08-10 NOTE — NUR
RN OPENING NOTE



PATIENT IN BED, AWAKE. PATIENT IS A/O X 1-2 AT THIS TIME. PATIENT IS CURRENTLY ON RA, 
TOLERATING WELL. NO SOB OR REPARATORY DISTRESS NOTED. TELE MONITOR READS AFIB CONTROLLED AT 
79 BPM. PATIENT HAS A R UPPER ARM MIDLINE, PATENT AND INTACT WITH NS AT 50 ML/HR ONGOING. 
PATIENT TO BE NPO AT MIDNIGHT FOR COLONOSCOPY TOMORROW. SAFETY MEASURES IN PLACE, BED LOCKED 
AND IN LOWEST POSITION, CALL LIGHT WITHIN REACH, SIDE RAILS UP. WILL MONITOR PATIENT 
CLOSELY.

## 2022-08-10 NOTE — NUR
TELE RN CLOSING NOTE



PT ASLEEP IN BED, EASILY AROUSED. A/O X1-2, REORIENTED PT AS NEEDED. ON RA, TOLERATING WELL. 
NO SOB NOTED. NOT IN ANY SIGN OF RESPIRATORY DISTRESS. ON CARDIAC TELE MONITOR WITH CURRENT 
READING OF AFIB CONTROLLED, HR 84. NO C/O CARDIAC DISTRESS VOICED AT THIS TIME. IV ACCESS IN 
SHANITA MIDLINE INTACT, AND PATENT WITH NS INFUSING AT 50ML/HR. ALL NEEDS ATTENDED. KEPT CLEAN 
AND COMFORTABLE. SAFETY MEASURES IN PLACE: BED IN LOWEST AND LOCKED POSITION, SIDE RAILS 
UPX2, BED ALARM ON AND CALL LIGHT WITHIN REACH. ENDORSED TO NIGHT SHIFT NURSE FOR MADELYN.

## 2022-08-10 NOTE — NUR
RN NOTE



RECEIVED A CALL FROM DR. GRANADOS WITH ORDERS TO GIVE PT 4 TABS OF DULCOLAX 5MG PO ONCE. AND 
AFTER AN HOUR TO GIVE 238 GM MIRALAX IN 64 OZ WATER ONCE PO THEN NPO AFTER MIDNIGHT.

## 2022-08-10 NOTE — NUR
RN NOTES:

ABLE TO SLEEP AND REST, NO PAIN OR DISCOMFORT NO SIGN OF RESPIRATORY DISTRESS, KEPT 
MONIOTRED, NPO FOR POSSIBLE COLONOSCOPY, TO CALL  IN THE MORNING TO F/U, STILL 
PENDING FOR GYNE F/U REGARDING HER IMAGING RESULTS, TURNING AND REPOSITIONING DONE, OFF 
LOADING DONE, IVF ONGOING, ENDORSED FOR CONTINUITY OF CARE.

-------------------------------------------------------------------------------

Addendum: 08/10/22 at 4624 by RICHIE ORTIZ RN

-------------------------------------------------------------------------------

ADDED NOTES:

ON CARDIAC MONITOR ANABELL WITH PVC RATE-88.

## 2022-08-10 NOTE — NUR
RN DORI



RECEIVED A CALL FROM ERICK OCHOA REPORTING PT'S CRITICAL LAB VALUE OF HEMOGLOBIN 4.4 AND 
HEMATOCRIT 15. CALLED DR. VILLALBA AND MADE HIM AWARE OF CRITICAL LAB WITH NO NEW ORDER AT THIS 
TIME. PER MD HE WILL SEE THE PT. No Residual Tumor Seen Histology Text: There were no malignant cells seen in the sections examined.

## 2022-08-10 NOTE — NUR
RN NOTE



PT REFUSED NEUTRA PHOS PACKET AND MEGACE SUSP SCHEDULED AT 1700. PT AGREED TO TAKE 
MEDICATIONS AT FIRST BUT REFUSED WHEN ABOUT TO ADMINISTER MEDICATION. EXPLAINED RISK AND 
BENEFITS STILL STRONGLY REFUSED.

## 2022-08-11 VITALS — SYSTOLIC BLOOD PRESSURE: 128 MMHG | DIASTOLIC BLOOD PRESSURE: 59 MMHG

## 2022-08-11 VITALS — SYSTOLIC BLOOD PRESSURE: 112 MMHG | DIASTOLIC BLOOD PRESSURE: 54 MMHG

## 2022-08-11 VITALS — DIASTOLIC BLOOD PRESSURE: 52 MMHG | SYSTOLIC BLOOD PRESSURE: 144 MMHG

## 2022-08-11 VITALS — SYSTOLIC BLOOD PRESSURE: 107 MMHG | DIASTOLIC BLOOD PRESSURE: 51 MMHG

## 2022-08-11 VITALS — DIASTOLIC BLOOD PRESSURE: 62 MMHG | SYSTOLIC BLOOD PRESSURE: 112 MMHG

## 2022-08-11 VITALS — DIASTOLIC BLOOD PRESSURE: 64 MMHG | SYSTOLIC BLOOD PRESSURE: 121 MMHG

## 2022-08-11 LAB
BUN SERPL-MCNC: 10 MG/DL (ref 7–18)
CALCIUM SERPL-MCNC: 7.8 MG/DL (ref 8.5–10.1)
CHLORIDE SERPL-SCNC: 119 MMOL/L (ref 98–107)
CO2 SERPL-SCNC: 20 MMOL/L (ref 21–32)
CREAT SERPL-MCNC: 1 MG/DL (ref 0.6–1.3)
GLUCOSE SERPL-MCNC: 106 MG/DL (ref 74–106)
POTASSIUM SERPL-SCNC: 2.6 MMOL/L (ref 3.5–5.1)
SODIUM SERPL-SCNC: 152 MMOL/L (ref 136–145)

## 2022-08-11 PROCEDURE — 05H933Z INSERTION OF INFUSION DEVICE INTO RIGHT BRACHIAL VEIN, PERCUTANEOUS APPROACH: ICD-10-PCS | Performed by: NURSE PRACTITIONER

## 2022-08-11 RX ADMIN — POTASSIUM CHLORIDE SCH MLS/HR: 200 INJECTION, SOLUTION INTRAVENOUS at 10:17

## 2022-08-11 RX ADMIN — MEGESTROL ACETATE SCH MG: 400 SUSPENSION ORAL at 17:00

## 2022-08-11 RX ADMIN — POTASSIUM CHLORIDE SCH MLS/HR: 200 INJECTION, SOLUTION INTRAVENOUS at 14:36

## 2022-08-11 RX ADMIN — Medication SCH TAB: at 09:00

## 2022-08-11 RX ADMIN — SODIUM CHLORIDE, SODIUM LACTATE, POTASSIUM CHLORIDE, AND CALCIUM CHLORIDE SCH MLS/HR: .6; .31; .03; .02 INJECTION, SOLUTION INTRAVENOUS at 09:49

## 2022-08-11 RX ADMIN — MEGESTROL ACETATE SCH MG: 400 SUSPENSION ORAL at 09:00

## 2022-08-11 RX ADMIN — POTASSIUM CHLORIDE SCH MLS/HR: 200 INJECTION, SOLUTION INTRAVENOUS at 15:40

## 2022-08-11 RX ADMIN — POTASSIUM CHLORIDE SCH MLS/HR: 200 INJECTION, SOLUTION INTRAVENOUS at 12:18

## 2022-08-11 RX ADMIN — Medication SCH ML: at 08:00

## 2022-08-11 RX ADMIN — Medication SCH ML: at 12:00

## 2022-08-11 RX ADMIN — SODIUM CHLORIDE, SODIUM LACTATE, POTASSIUM CHLORIDE, AND CALCIUM CHLORIDE SCH MLS/HR: .6; .31; .03; .02 INJECTION, SOLUTION INTRAVENOUS at 22:58

## 2022-08-11 RX ADMIN — Medication SCH PACKET: at 09:00

## 2022-08-11 RX ADMIN — Medication SCH PACKET: at 17:00

## 2022-08-11 RX ADMIN — METOPROLOL SUCCINATE SCH MG: 50 TABLET, EXTENDED RELEASE ORAL at 09:00

## 2022-08-11 RX ADMIN — Medication SCH MG: at 09:00

## 2022-08-11 RX ADMIN — POTASSIUM CHLORIDE SCH MLS/HR: 200 INJECTION, SOLUTION INTRAVENOUS at 16:52

## 2022-08-11 RX ADMIN — POTASSIUM CHLORIDE SCH MLS/HR: 200 INJECTION, SOLUTION INTRAVENOUS at 09:15

## 2022-08-11 RX ADMIN — Medication SCH ML: at 09:00

## 2022-08-11 RX ADMIN — POTASSIUM CHLORIDE SCH MLS/HR: 200 INJECTION, SOLUTION INTRAVENOUS at 13:29

## 2022-08-11 RX ADMIN — Medication SCH ML: at 17:00

## 2022-08-11 RX ADMIN — Medication SCH MG: at 22:00

## 2022-08-11 RX ADMIN — POTASSIUM CHLORIDE SCH MLS/HR: 200 INJECTION, SOLUTION INTRAVENOUS at 08:02

## 2022-08-11 RX ADMIN — POTASSIUM CHLORIDE SCH MLS/HR: 200 INJECTION, SOLUTION INTRAVENOUS at 11:17

## 2022-08-11 RX ADMIN — POTASSIUM CHLORIDE SCH MLS/HR: 200 INJECTION, SOLUTION INTRAVENOUS at 17:56

## 2022-08-11 NOTE — NUR
RN NOTE



RECEIVED A CALL FROM DR. LIZ, ANESTHESIOLOGIST. TO RECHECK POTASSIUM LEVEL TODAY AT 1630.

## 2022-08-11 NOTE — NUR
RN CLOSING NOTE



PATIENT IN BED, AWAKE. PATIENT IS A/O X 1-2 AT THIS TIME. PATIENT IS CURRENTLY ON RA, 
TOLERATING WELL. NO SOB OR REPARATORY DISTRESS NOTED. TELE MONITOR READS AFIB CONTROLLED AT 
80 BPM. PATIENT HAS A R FA 22G, PATENT AND INTACT WITH D5 1/2NS  ML/HR ONGOING. 
PATIENT NPO AT THIS TIME. SAFETY MEASURES IN PLACE, BED LOCKED AND IN LOWEST POSITION, CALL 
LIGHT WITHIN REACH, SIDE RAILS UP. ALL NEEDS MET AND ATTENDED. ALL ORDERS CARRIED OUT. WILL 
ENDORSE TO DAY SHIFT NURSE FOR MADELYN.

## 2022-08-11 NOTE — NUR
RN OPENING NOTE



RECEIVED PATIENT IN BED, A/OX2. NO S/S OF APPARENT DISTRESS IN ROOM AIR. DENIES PAIN NOR 
DISCOMFORT AT THIS TIME. READING A-FIB CONTROLLED RATE 72 BPM. PATIENT R.UA MIDLINE RUNNING 
POTASSIUM CHL. 20MEQ IN D5W @75MLS/HR. PATIENT IS CURRENTLY NPO AND FOR COLONOSCOPY 
SUPPOSEDLY TONIGHT, WILL CONTINUE WITH PLAN OF CARE FOR PATIENT. SAFETY IN PLACE AND 
RE-ORIENTED AND  ENCOURAGED WITH THE USE OF CALL LIGHT.

## 2022-08-11 NOTE — NUR
TELE RN CLOSING NOTE



PT ASLEEP IN BED, EASILY AROUSED. A/O X1, REORIENTED PT AS NEEDED. ON RA, TOLERATING WELL. 
NO SOB NOTED. NOT IN ANY SIGN OF RESPIRATORY DISTRESS. ON CARDIAC TELE MONITOR WITH CURRENT 
READING OF AFIB CONTROLLED, HR 82. NO C/O CARDIAC DISTRESS VOICED AT THIS TIME. IV ACCESS IN 
SHANITA MIDLINE G #18 INTACT, AND PATENT WITH POTASSIUM CHLORIDE IV IN D5W INFUSING AT 75ML/HR. 
ALL NEEDS ATTENDED. KEPT CLEAN AND COMFORTABLE. SAFETY MEASURES IN PLACE: BED IN LOWEST AND 
LOCKED POSITION, SIDE RAILS UPX2, BED ALARM ON AND CALL LIGHT WITHIN REACH. WILL ENDORSED TO 
NIGHT SHIFT NURSE FOR MADELYN.

## 2022-08-11 NOTE — NUR
TELE RN OPENING NOTE



RECEIVED PT ASLEEP IN BED, EASILY AROUSED. A/O X1, REORIENTED PT AS NEEDED. ON RA, 
TOLERATING WELL. NO SOB NOTED. NOT IN ANY SIGN OF RESPIRATORY DISTRESS. ON CARDIAC TELE 
MONITOR WITH CURRENT READING OF AFIB CONTROLLED, HR 78. NO C/O CARDIAC DISTRESS VOICED AT 
THIS TIME. IV ACCESS IN SHANITA MIDLINE INTACT, AND PATENT WITH NS INFUSING AT 50ML/HR. SAFETY 
MEASURES IN PLACE: BED IN LOWEST AND LOCKED POSITION, SIDE RAILS UPX2, BED ALARM ON AND CALL 
LIGHT WITHIN REACH. WILL CONTINUE TO MONITOR PT.

## 2022-08-11 NOTE — NUR
RFA 22G IV ACCESS ESTABLISHED. PATENT AND INTACT, WRAPPED IN KERLIX TO PREVENT PATIENT FROM 
REMOVING IV LINE

## 2022-08-12 VITALS — SYSTOLIC BLOOD PRESSURE: 118 MMHG | DIASTOLIC BLOOD PRESSURE: 53 MMHG

## 2022-08-12 VITALS — SYSTOLIC BLOOD PRESSURE: 99 MMHG | DIASTOLIC BLOOD PRESSURE: 54 MMHG

## 2022-08-12 VITALS — DIASTOLIC BLOOD PRESSURE: 59 MMHG | SYSTOLIC BLOOD PRESSURE: 110 MMHG

## 2022-08-12 VITALS — DIASTOLIC BLOOD PRESSURE: 52 MMHG | SYSTOLIC BLOOD PRESSURE: 110 MMHG

## 2022-08-12 LAB
ALBUMIN/GLOB SERPL: 0.9 {RATIO} (ref 0.7–1.7)
ALPHA1 GLOB SERPL ELPH-MCNC: 0.2 G/DL (ref 0–0.4)
ALPHA2 GLOB SERPL ELPH-MCNC: 0.6 G/DL (ref 0.4–1)
B-GLOBULIN SERPL ELPH-MCNC: 1 G/DL (ref 0.7–1.3)
BUN SERPL-MCNC: 7 MG/DL (ref 7–18)
CALCIUM SERPL-MCNC: 7.7 MG/DL (ref 8.5–10.1)
CHLORIDE SERPL-SCNC: 120 MMOL/L (ref 98–107)
CO2 SERPL-SCNC: 21 MMOL/L (ref 21–32)
CREAT SERPL-MCNC: 0.8 MG/DL (ref 0.6–1.3)
EOSINOPHIL NFR BLD MANUAL: 2 % (ref 0–4)
FERRITIN SERPL-MCNC: 212 NG/ML (ref 8–388)
GLUCOSE SERPL-MCNC: 106 MG/DL (ref 74–106)
HCT VFR BLD AUTO: 15 % (ref 33–45)
HGB BLD-MCNC: 4.3 G/DL (ref 11.5–14.8)
IGA SERPL-MCNC: 416 MG/DL (ref 64–422)
IGM SERPL-MCNC: 107 MG/DL (ref 26–217)
IRON SERPL-MCNC: 24 UG/DL (ref 50–175)
LYMPHOCYTES NFR BLD MANUAL: 24 % (ref 16–48)
MCHC RBC AUTO-ENTMCNC: 29 G/DL (ref 31–36)
MCV RBC AUTO: 68 FL (ref 82–100)
MONOCYTES NFR BLD MANUAL: 4 % (ref 0–11)
NEUTS SEG NFR BLD MANUAL: 70 % (ref 42–76)
PLATELET # BLD AUTO: 236 K/UL (ref 150–450)
POTASSIUM SERPL-SCNC: 3.4 MMOL/L (ref 3.5–5.1)
RBC # BLD AUTO: 2.21 MIL/UL (ref 4–5.2)
SODIUM SERPL-SCNC: 149 MMOL/L (ref 136–145)
TIBC SERPL-MCNC: 206 UG/DL (ref 250–450)
WBC NRBC COR # BLD AUTO: 5.7 K/UL (ref 4.3–11)

## 2022-08-12 RX ADMIN — Medication SCH ML: at 09:00

## 2022-08-12 RX ADMIN — Medication SCH ML: at 11:31

## 2022-08-12 RX ADMIN — Medication SCH ML: at 08:00

## 2022-08-12 RX ADMIN — MEGESTROL ACETATE SCH MG: 400 SUSPENSION ORAL at 09:00

## 2022-08-12 RX ADMIN — MEGESTROL ACETATE SCH MG: 400 SUSPENSION ORAL at 17:35

## 2022-08-12 RX ADMIN — Medication SCH TAB: at 09:00

## 2022-08-12 RX ADMIN — Medication SCH MG: at 09:00

## 2022-08-12 RX ADMIN — METOPROLOL SUCCINATE SCH MG: 50 TABLET, EXTENDED RELEASE ORAL at 09:00

## 2022-08-12 RX ADMIN — Medication SCH MG: at 21:39

## 2022-08-12 RX ADMIN — Medication SCH PACKET: at 17:35

## 2022-08-12 RX ADMIN — Medication SCH ML: at 17:35

## 2022-08-12 RX ADMIN — Medication SCH PACKET: at 09:00

## 2022-08-12 NOTE — NUR
RN NOTES



OVER THE PHONE CONSENT PROVIDED BY PATIENTS BROTHER (CLAUDIA), FOR PATIENT TO HAVE CT OF 
ABDOMEN, CHEST AND PELVIS WITH CONTRAST DONE. 2ND RN WITNESS PROVIDED BY CHARGE NURSE, 
LULU. CONSENTS ARE IN PATIENTS CHART. Post-Care Instructions: Aftercare Ln2 given Number Of Freeze-Thaw Cycles: 1 freeze-thaw cycle Duration Of Freeze Thaw-Cycle (Seconds): 5 Render Note In Bullet Format When Appropriate: No Show Aperture Variable?: Yes Detail Level: Zone

## 2022-08-12 NOTE — NUR
RN CLOSING NOTES



PATIENT STABLE IN ROOM WITH COMPANY AT BEDSIDE. A/O X1. ON RA WITH NO SOB OR DISTRESS NOTED. 
DENIES PAIN AT THIS TIME. ALL NEEDS MET. SAFETY PRECAUTIONS MAINTAINED. WILL ENDORSE TO THE 
NIGHT SHIFT NURSE FOR MADELYN

## 2022-08-12 NOTE — NUR
MS RN OPENING NOTES



RECEIVED PATIENT IN BED, A/O X1-2. STABLE ON RA. DENIES ANY PAIN OR DISCOMFORT AT THIS 
TIME.BILATERAL SIDE RIALS UP FOR SAFETY HOB ELEVATED FOR ASPIRATION PRECAUTIONS. BED IN 
LOWEST POSITION  BED ALARM ON CALL LIGHT AND TABLE WITHIN REACH. ALL NURSING NEED MET AT 
THIS TIME. WILL CONTINUE TO MONITOR.

## 2022-08-12 NOTE — NUR
TELE RN CLOSING



PATIENT IN BED, A/OX2. NO S/S OF APPARENT DISTRESS ON ROOM AIR. DENIES ANY PAIN NOR 
DISCOMFORT AT THIS TIME. READING CONTROLLED A-FIB THROUGHOUT SHIFT. IV POTASSIUM IN D5W 
RUNNING @75MLS/HR. ALL NEEDS ATTENDED. NPO SINCE MIDNIGHT. IN FOR COLONOSCOPY AND EGD TODAY. 
SAFETY KEPT IN PLACE THE WHOLE SHIFT. WILL ENDORSE TO MORNING SHIFT RN FOR CONTINUITY OF 
CARE.

## 2022-08-12 NOTE — NUR
RN NOTES



PATIENT SEEN BY GI, DR COOMBS. COLONOSCOPY CANCELLED. PATIENT AND PATIENTS FAMILY NOTIFIED.

## 2022-08-13 VITALS — DIASTOLIC BLOOD PRESSURE: 58 MMHG | SYSTOLIC BLOOD PRESSURE: 108 MMHG

## 2022-08-13 VITALS — SYSTOLIC BLOOD PRESSURE: 112 MMHG | DIASTOLIC BLOOD PRESSURE: 60 MMHG

## 2022-08-13 VITALS — DIASTOLIC BLOOD PRESSURE: 81 MMHG | SYSTOLIC BLOOD PRESSURE: 117 MMHG

## 2022-08-13 LAB
BASOPHILS # BLD AUTO: 0 K/UL (ref 0–0.2)
BASOPHILS NFR BLD AUTO: 0.6 % (ref 0–2)
BUN SERPL-MCNC: 7 MG/DL (ref 7–18)
CALCIUM SERPL-MCNC: 7.5 MG/DL (ref 8.5–10.1)
CHLORIDE SERPL-SCNC: 114 MMOL/L (ref 98–107)
CO2 SERPL-SCNC: 20 MMOL/L (ref 21–32)
CREAT SERPL-MCNC: 0.8 MG/DL (ref 0.6–1.3)
EOSINOPHIL NFR BLD AUTO: 1.5 % (ref 0–6)
GLUCOSE SERPL-MCNC: 112 MG/DL (ref 74–106)
HCT VFR BLD AUTO: 17 % (ref 33–45)
HGB BLD-MCNC: 4.6 G/DL (ref 11.5–14.8)
LYMPHOCYTES NFR BLD AUTO: 1.8 K/UL (ref 0.8–4.8)
LYMPHOCYTES NFR BLD AUTO: 31.7 % (ref 20–44)
MAGNESIUM SERPL-MCNC: 2.1 MG/DL (ref 1.8–2.4)
MCHC RBC AUTO-ENTMCNC: 27 G/DL (ref 31–36)
MCV RBC AUTO: 74 FL (ref 82–100)
MONOCYTES NFR BLD AUTO: 0.4 K/UL (ref 0.1–1.3)
MONOCYTES NFR BLD AUTO: 7.8 % (ref 2–12)
NEUTROPHILS # BLD AUTO: 3.2 K/UL (ref 1.8–8.9)
NEUTROPHILS NFR BLD AUTO: 58.4 % (ref 43–81)
PLATELET # BLD AUTO: 104 K/UL (ref 150–450)
POTASSIUM SERPL-SCNC: 3.1 MMOL/L (ref 3.5–5.1)
RBC # BLD AUTO: 2.27 MIL/UL (ref 4–5.2)
SODIUM SERPL-SCNC: 141 MMOL/L (ref 136–145)
WBC NRBC COR # BLD AUTO: 5.6 K/UL (ref 4.3–11)

## 2022-08-13 RX ADMIN — Medication SCH ML: at 07:57

## 2022-08-13 RX ADMIN — Medication SCH PACKET: at 08:46

## 2022-08-13 RX ADMIN — Medication SCH TAB: at 08:26

## 2022-08-13 RX ADMIN — Medication SCH TAB: at 08:46

## 2022-08-13 RX ADMIN — MEGESTROL ACETATE SCH MG: 400 SUSPENSION ORAL at 08:46

## 2022-08-13 RX ADMIN — SODIUM CHLORIDE SCH MLS/HR: 9 INJECTION, SOLUTION INTRAVENOUS at 14:21

## 2022-08-13 RX ADMIN — Medication SCH PACKET: at 08:26

## 2022-08-13 RX ADMIN — Medication SCH MG: at 08:26

## 2022-08-13 RX ADMIN — MEGESTROL ACETATE SCH MG: 400 SUSPENSION ORAL at 17:00

## 2022-08-13 RX ADMIN — FOLIC ACID SCH MG: 1 TABLET ORAL at 08:26

## 2022-08-13 RX ADMIN — MEGESTROL ACETATE SCH MG: 400 SUSPENSION ORAL at 08:26

## 2022-08-13 RX ADMIN — Medication SCH MCG: at 08:47

## 2022-08-13 RX ADMIN — METOPROLOL SUCCINATE SCH MG: 50 TABLET, EXTENDED RELEASE ORAL at 08:26

## 2022-08-13 RX ADMIN — Medication SCH ML: at 07:58

## 2022-08-13 RX ADMIN — Medication SCH MG: at 22:00

## 2022-08-13 RX ADMIN — Medication SCH ML: at 11:56

## 2022-08-13 RX ADMIN — Medication SCH PACKET: at 17:00

## 2022-08-13 RX ADMIN — Medication SCH ML: at 08:19

## 2022-08-13 RX ADMIN — Medication SCH ML: at 17:07

## 2022-08-13 NOTE — NUR
RN NOTES



PATIENT REFUSES TO TAKE SUPPLEMENTAL PO MEDICATIONS. STATES, "I DO NOT NORMALLY TAKE THIS 
MANY MEDICATIONS. I DON'T NEED THEM". PATIENT REMAINS A/O X1-2 WITH FORGETFULNESS. PATIENT 
ADHERES TO INJECTIONS AND IV MEDICATIONS BUT HAS POOR PO INTAKE. WILL CONTINUE TO MONITOR 
PATIENT AND INTERVENE ACCORDINGLY

## 2022-08-13 NOTE — NUR
RN NOTES



PATIENT APPEARS A LITTLE MORE LETHARGIC THAN INITIAL ASSESSMENT THIS MORNING. ONLY ADHERED 
TO TAKING A COUPLE OF MEDICATIONS THIS AM. REFUSES TO TAKE ANYMORE MEDICATIONS THIS MORNING. 
EDUCATED PATIENT ON IMPORTANCE OF MEDICATIONS AND PATIENT DOES NOT WANT ACCEPT. WILL 
CONTINUE TO MONITOR AND LET MD KNOW.

## 2022-08-13 NOTE — NUR
RN NOTES



RECEIVED CRITICAL RESULTS FROM CT CHEST COMPLETED TODAY. PATIENT IS POSITIVE FOR PE IN BOTH 
LOWER LOBES AND MIDDLE RIGHT LOBE. WILL NOTIFY DOCTOR AND FOLLOW ORDERS. PATIENT CURRENTLY 
NOT EXPERIENCING RESPIRATORY DISTRESS ON RA. WILL CONTINUE MONITORING

## 2022-08-13 NOTE — NUR
DR. CARROLL MADE AWARE OF CT ABDOMEN RESULT, PATIENT HAS PE. DR. CARROLL MADE AWARE OF PATIENT'S 
H/H, 4.6/17. DR. CARROLL WILL TALK TO PATIENT AS WELL AS DR. VILLALBA

## 2022-08-13 NOTE — NUR
NEW ORDER FROM DR. CARROLL CT OF ABDOMEN AND PELVIS. RECENT CT OF ABDOMEN DONE 8/13/22, SENT 
COPY OF RESULT TO DR. CARROLL.

## 2022-08-13 NOTE — NUR
MS RN OPENING NOTES



RECEIVED PATIENT IN BED, A/O X1-2. STABLE ON RA. DENIES ANY PAIN OR DISCOMFORT AT THIS 
TIME.BILATERAL SIDE RIALS UP FOR SAFETY HOB ELEVATED FOR ASPIRATION PRECAUTIONS. BED IN 
LOWEST POSITION  BED ALARM ON CALL LIGHT AND TABLE WITHIN REACH. PT NPO SINCE MIDNIGHT FOR 
CT WITH CONTRAST. ALL NURSING NEED MET AT THIS TIME. WILL ENDORSE CARE.

## 2022-08-13 NOTE — NUR
RECEIVED PATIENT IN BED, ALERT/ORIENTED X2-3, ROOM AIR, GENERALIZED WEAKNESS, BEDREST. 
EATING DINNER, BROTHER AND FRIENDS ARE AT THE BEDSIDE, KEPT SAFE, WILL CONTINUE TO MONITOR.

## 2022-08-13 NOTE — NUR
RN CLOSING NOTES



PATIENT IN BED WITH NO COMPLAINTS AT THIS TIME. ALL NEEDS MET. SAFETY PRECAUTIONS IN PLACE. 
ENDORSED REPORT TO THE NIGHT SHIFT NURSE FOR MADELYN

## 2022-08-13 NOTE — NUR
MS RN OPENING NOTES



RECEIVED PATIENT IN BED, A/O X1-2. STABLE ON RA. DENIES ANY PAIN OR DISCOMFORT AT THIS TIME. 
SHANITA MIDLINE INTACT AND PATENT WITH NS 20MEQ KCL RUNNING @ 75ML/HR. SAFETY PRECAUTIONS IN 
PLACE. BED IN LOWEST POSITION BED ALARM ON CALL LIGHT AND TABLE WITHIN REACH. PT NPO SINCE 
MIDNIGHT FOR CT WITH CONTRAST. WILL CONTINUE TO MONITOR PATIENT

## 2022-08-13 NOTE — NUR
SEEN BY DR. CARROLL, TALKED ABOUT THE NEED FOR BLOOD TRANSFUSION DUE TO SEVERE ANEMIA AND NEED 
FOR ANTICOAGULATION DUE TO PULMONARY EMBOLISM, PATIENT STILL REFUSED.

## 2022-08-14 VITALS — DIASTOLIC BLOOD PRESSURE: 60 MMHG | SYSTOLIC BLOOD PRESSURE: 109 MMHG

## 2022-08-14 VITALS — SYSTOLIC BLOOD PRESSURE: 101 MMHG | DIASTOLIC BLOOD PRESSURE: 52 MMHG

## 2022-08-14 VITALS — SYSTOLIC BLOOD PRESSURE: 101 MMHG | DIASTOLIC BLOOD PRESSURE: 49 MMHG

## 2022-08-14 LAB
BASOPHILS # BLD AUTO: 0 K/UL (ref 0–0.2)
BASOPHILS NFR BLD AUTO: 0.7 % (ref 0–2)
BUN SERPL-MCNC: 6 MG/DL (ref 7–18)
CALCIUM SERPL-MCNC: 7.3 MG/DL (ref 8.5–10.1)
CHLORIDE SERPL-SCNC: 114 MMOL/L (ref 98–107)
CO2 SERPL-SCNC: 19 MMOL/L (ref 21–32)
CREAT SERPL-MCNC: 0.8 MG/DL (ref 0.6–1.3)
EOSINOPHIL NFR BLD AUTO: 1.1 % (ref 0–6)
GLUCOSE SERPL-MCNC: 83 MG/DL (ref 74–106)
HCT VFR BLD AUTO: 18 % (ref 33–45)
HGB BLD-MCNC: 5.2 G/DL (ref 11.5–14.8)
LYMPHOCYTES NFR BLD AUTO: 1.8 K/UL (ref 0.8–4.8)
LYMPHOCYTES NFR BLD AUTO: 37.2 % (ref 20–44)
LYMPHOCYTES NFR BLD MANUAL: 21 % (ref 16–48)
MAGNESIUM SERPL-MCNC: 2.1 MG/DL (ref 1.8–2.4)
MCHC RBC AUTO-ENTMCNC: 30 G/DL (ref 31–36)
MCV RBC AUTO: 69 FL (ref 82–100)
MONOCYTES NFR BLD AUTO: 0.3 K/UL (ref 0.1–1.3)
MONOCYTES NFR BLD AUTO: 5.4 % (ref 2–12)
MONOCYTES NFR BLD MANUAL: 5 % (ref 0–11)
NEUTROPHILS # BLD AUTO: 2.7 K/UL (ref 1.8–8.9)
NEUTROPHILS NFR BLD AUTO: 55.6 % (ref 43–81)
NEUTS SEG NFR BLD MANUAL: 74 % (ref 42–76)
PHOSPHATE SERPL-MCNC: 2.5 MG/DL (ref 2.5–4.9)
PLATELET # BLD AUTO: 165 K/UL (ref 150–450)
POTASSIUM SERPL-SCNC: 3 MMOL/L (ref 3.5–5.1)
RBC # BLD AUTO: 2.55 MIL/UL (ref 4–5.2)
SODIUM SERPL-SCNC: 144 MMOL/L (ref 136–145)
WBC NRBC COR # BLD AUTO: 4.9 K/UL (ref 4.3–11)

## 2022-08-14 RX ADMIN — METOPROLOL SUCCINATE SCH MG: 50 TABLET, EXTENDED RELEASE ORAL at 08:18

## 2022-08-14 RX ADMIN — Medication SCH ML: at 12:23

## 2022-08-14 RX ADMIN — POTASSIUM CHLORIDE SCH MEQ: 1500 TABLET, EXTENDED RELEASE ORAL at 09:18

## 2022-08-14 RX ADMIN — POTASSIUM CHLORIDE SCH MEQ: 1500 TABLET, EXTENDED RELEASE ORAL at 11:24

## 2022-08-14 RX ADMIN — POTASSIUM CHLORIDE SCH MEQ: 1500 TABLET, EXTENDED RELEASE ORAL at 10:20

## 2022-08-14 RX ADMIN — Medication SCH ML: at 08:00

## 2022-08-14 RX ADMIN — POTASSIUM CHLORIDE SCH MEQ: 1500 TABLET, EXTENDED RELEASE ORAL at 08:17

## 2022-08-14 RX ADMIN — SODIUM CHLORIDE SCH MLS/HR: 9 INJECTION, SOLUTION INTRAVENOUS at 14:25

## 2022-08-14 RX ADMIN — Medication SCH MG: at 21:25

## 2022-08-14 RX ADMIN — Medication SCH PACKET: at 08:16

## 2022-08-14 RX ADMIN — Medication SCH MCG: at 08:17

## 2022-08-14 RX ADMIN — Medication SCH MG: at 08:18

## 2022-08-14 RX ADMIN — MEGESTROL ACETATE SCH MG: 400 SUSPENSION ORAL at 08:17

## 2022-08-14 RX ADMIN — FOLIC ACID SCH MG: 1 TABLET ORAL at 08:17

## 2022-08-14 RX ADMIN — POTASSIUM CHLORIDE SCH MEQ: 1500 TABLET, EXTENDED RELEASE ORAL at 12:25

## 2022-08-14 RX ADMIN — Medication SCH ML: at 08:16

## 2022-08-14 RX ADMIN — Medication SCH ML: at 17:13

## 2022-08-14 RX ADMIN — Medication SCH TAB: at 08:16

## 2022-08-14 RX ADMIN — MEGESTROL ACETATE SCH MG: 400 SUSPENSION ORAL at 17:13

## 2022-08-14 NOTE — NUR
RN CLOSING NOTE



PATIENT AWAKE IN BED RESTING. A/O X 2. NO PAIN NOTED AT THIS TIME. ON ROOM AIR, NO DISTRESS 
OR SHORTNESS OF BREATH NOTED. IV ACCESS SHANITA MIDLINE, INTACT, PATENT AND FLUSHING WELL. ALL 
SCHEDULE MEDICATIONS ADMINISTERED. FALL AND SAFETY MEASURES IN PLACE, BED ALARM ON, BED IN 
LOW AND LOCK POSITION, CALL LIGHT AND TABLE WITHIN EASY REACH, SIDE RAILS UP X2. WILL 
ENDORSE TO NIGHT SHIFT.

## 2022-08-14 NOTE — NUR
RN OPENING NOTE



PATIENT AWAKE IN BED RESTING. A/O X 2. NO PAIN NOTED AT THIS TIME. ON ROOM AIR, NO DISTRESS 
OR SHORTNESS OF BREATH NOTED. IV ACCESS SHAINTA MIDLINE, INTACT, PATENT AND FLUSHING WELL. FALL 
AND SAFETY MEASURES IN PLACE, BED ALARM ON, BED IN LOW AND LOCK POSITION, CALL LIGHT AND 
TABLE WITHIN EASY REACH, SIDE RAILS UP X2. WILL CONTINUE TO MONITOR.

## 2022-08-14 NOTE — NUR
RN NOTES

LETHARGIC, BEDREST, STABLE ON ROOM AIR, NO COMPLAIN OF PAIN, REFUSED SENNA, LAST BM 8/11/22. 
RECEIVED LAB RESULT, H/H 5.2/18, INCREASED HGB FROM 8/13/22. PER CONVERSATION, PATIENT AND 
FAMILY REFUSED BLOOD TRANSFUSION, PATIENT ON FERRITIN AND EPOGEN.

## 2022-08-14 NOTE — NUR
RN OPENING NOTE



PATIENT IN BED AAOX2.ON ROOM AIR LELAND WELL NO SIGN SOB/DISTRESS NOTED.BREATHING EVEN AND 
UNLABORED.IV ACCESS SHANITA MIDLINE, INTACT, PATENT AND FLUSHING WELL. FALL AND SAFETY MEASURES 
IN PLACE, BED ALARM ON, BED IN LOW AND LOCK POSITION, CALL LIGHT AND TABLE WITHIN EASY 
REACH, SIDE RAILS UP X2. WILL CONTINUE TO MONITOR.

## 2022-08-15 VITALS — SYSTOLIC BLOOD PRESSURE: 108 MMHG | DIASTOLIC BLOOD PRESSURE: 50 MMHG

## 2022-08-15 VITALS — SYSTOLIC BLOOD PRESSURE: 116 MMHG | DIASTOLIC BLOOD PRESSURE: 66 MMHG

## 2022-08-15 VITALS — DIASTOLIC BLOOD PRESSURE: 57 MMHG | SYSTOLIC BLOOD PRESSURE: 109 MMHG

## 2022-08-15 LAB
BASOPHILS # BLD AUTO: 0 K/UL (ref 0–0.2)
BASOPHILS NFR BLD AUTO: 0.3 % (ref 0–2)
BUN SERPL-MCNC: 6 MG/DL (ref 7–18)
CALCIUM SERPL-MCNC: 7.4 MG/DL (ref 8.5–10.1)
CHLORIDE SERPL-SCNC: 118 MMOL/L (ref 98–107)
CO2 SERPL-SCNC: 20 MMOL/L (ref 21–32)
CREAT SERPL-MCNC: 0.8 MG/DL (ref 0.6–1.3)
EOSINOPHIL NFR BLD AUTO: 0.6 % (ref 0–6)
GLUCOSE SERPL-MCNC: 86 MG/DL (ref 74–106)
HCT VFR BLD AUTO: 17 % (ref 33–45)
HGB BLD-MCNC: 5 G/DL (ref 11.5–14.8)
LYMPHOCYTES NFR BLD AUTO: 1.3 K/UL (ref 0.8–4.8)
LYMPHOCYTES NFR BLD AUTO: 23.7 % (ref 20–44)
MCHC RBC AUTO-ENTMCNC: 29 G/DL (ref 31–36)
MCV RBC AUTO: 70 FL (ref 82–100)
MONOCYTES NFR BLD AUTO: 0.5 K/UL (ref 0.1–1.3)
MONOCYTES NFR BLD AUTO: 8.3 % (ref 2–12)
NEUTROPHILS # BLD AUTO: 3.7 K/UL (ref 1.8–8.9)
NEUTROPHILS NFR BLD AUTO: 67.1 % (ref 43–81)
PLATELET # BLD AUTO: 147 K/UL (ref 150–450)
POTASSIUM SERPL-SCNC: 4 MMOL/L (ref 3.5–5.1)
RBC # BLD AUTO: 2.41 MIL/UL (ref 4–5.2)
SODIUM SERPL-SCNC: 146 MMOL/L (ref 136–145)
WBC NRBC COR # BLD AUTO: 5.5 K/UL (ref 4.3–11)

## 2022-08-15 RX ADMIN — Medication SCH MCG: at 08:06

## 2022-08-15 RX ADMIN — SODIUM CHLORIDE SCH MLS/HR: 9 INJECTION, SOLUTION INTRAVENOUS at 14:11

## 2022-08-15 RX ADMIN — Medication SCH ML: at 11:55

## 2022-08-15 RX ADMIN — Medication SCH ML: at 08:06

## 2022-08-15 RX ADMIN — MEGESTROL ACETATE SCH MG: 400 SUSPENSION ORAL at 08:06

## 2022-08-15 RX ADMIN — Medication SCH MG: at 08:06

## 2022-08-15 RX ADMIN — METOPROLOL SUCCINATE SCH MG: 50 TABLET, EXTENDED RELEASE ORAL at 08:13

## 2022-08-15 RX ADMIN — FOLIC ACID SCH MG: 1 TABLET ORAL at 08:06

## 2022-08-15 RX ADMIN — Medication SCH ML: at 17:11

## 2022-08-15 RX ADMIN — MEGESTROL ACETATE SCH MG: 400 SUSPENSION ORAL at 16:27

## 2022-08-15 RX ADMIN — Medication SCH MG: at 21:39

## 2022-08-15 RX ADMIN — Medication SCH ML: at 08:13

## 2022-08-15 RX ADMIN — Medication SCH TAB: at 08:06

## 2022-08-15 NOTE — NUR
RN CLOSING NOTE;321



PATIENT IN BED AAOX2.ON ROOM AIR LELAND WELL NO SIGN SOB/DISTRESS NOTED.BREATHING EVEN AND 
UNLABORED.DUE MEDS GIVEN AS ORDER.IV ACCESS SHANITA MIDLINE,INTACT, PATENT AND FLUSHING WELL.ALL 
NEEDS ATTENDED. FALL AND SAFETY MEASURES IN PLACE, BED ALARM ON, BED IN LOW AND LOCK 
POSITION, CALL LIGHT AND TABLE WITHIN EASY REACH, SIDE RAILS UP X2. WILL ENDORSED TO NEXT 
SHIFT.

## 2022-08-15 NOTE — NUR
RN OPENING NOTES



RECEIVED PATIENT IN BED, AWAKE, A/O X2, VERBALLY RESPONSIVE. NO SIGNS OF ACUTE DISTRESS 
NOTED. ON ROOM AIR, NO SOB NOTED, BREATHING EVEN AND UNLABORED. NOTED WITH RIGHT UPPER ARM 
MIDLINE #18G, INTACT AND PATENT, SALINE LOCKED. DENIES ANY PAIN AT THIS TIME. SAFETY MEASURE 
IN PLACE. BED IN LOWEST AND LOCKED POSITION, SIDE RAILS UP X2, CALL LIGHT PLACED WITHIN EASY 
REACH. WILL, CONTINUE TO MONITOR PATIENT.

## 2022-08-15 NOTE — NUR
RN CLOSING NOTE



PATIENT IN BED, AWAKE, FAMILY AT BEDSIDE. NO SIGNS OF ACUTE DISTRESS NOTED. STABLE IN ROOM 
AIR, NO SOB NOTED, BREATHING EVEN AND UNLABORED. IV ACCESS ON RIGHT UPPER ARM MIDLINE #18G, 
INTACT AND PATENT, SALINE LOCKED. ALL DUE MEDS GIVEN, TOLERATED WELL. SAFETY MEASURE IN 
PLACE. BED IN LOWEST AND LOCKED POSITION, SIDE RAILS UP X2, CALL LIGHT PLACED WITHIN EASY 
REACH. WILL ENDORSE TO NEXT SHIFT FOR CONTINUITY OF CARE.

## 2022-08-16 VITALS — DIASTOLIC BLOOD PRESSURE: 60 MMHG | SYSTOLIC BLOOD PRESSURE: 120 MMHG

## 2022-08-16 VITALS — SYSTOLIC BLOOD PRESSURE: 114 MMHG | DIASTOLIC BLOOD PRESSURE: 51 MMHG

## 2022-08-16 VITALS — SYSTOLIC BLOOD PRESSURE: 108 MMHG | DIASTOLIC BLOOD PRESSURE: 50 MMHG

## 2022-08-16 LAB
BASOPHILS # BLD AUTO: 0 K/UL (ref 0–0.2)
BASOPHILS NFR BLD AUTO: 0.7 % (ref 0–2)
BUN SERPL-MCNC: 7 MG/DL (ref 7–18)
CALCIUM SERPL-MCNC: 7.6 MG/DL (ref 8.5–10.1)
CHLORIDE SERPL-SCNC: 116 MMOL/L (ref 98–107)
CO2 SERPL-SCNC: 19 MMOL/L (ref 21–32)
CREAT SERPL-MCNC: 0.8 MG/DL (ref 0.6–1.3)
EOSINOPHIL NFR BLD AUTO: 0.4 % (ref 0–6)
GLUCOSE SERPL-MCNC: 89 MG/DL (ref 74–106)
HCT VFR BLD AUTO: 17 % (ref 33–45)
HGB BLD-MCNC: 5.2 G/DL (ref 11.5–14.8)
LYMPHOCYTES NFR BLD AUTO: 1.3 K/UL (ref 0.8–4.8)
LYMPHOCYTES NFR BLD AUTO: 21.2 % (ref 20–44)
LYMPHOCYTES NFR BLD MANUAL: 11 % (ref 16–48)
MCHC RBC AUTO-ENTMCNC: 30 G/DL (ref 31–36)
MCV RBC AUTO: 71 FL (ref 82–100)
MONOCYTES NFR BLD AUTO: 0.4 K/UL (ref 0.1–1.3)
MONOCYTES NFR BLD AUTO: 7.1 % (ref 2–12)
MONOCYTES NFR BLD MANUAL: 3 % (ref 0–11)
NEUTROPHILS # BLD AUTO: 4.3 K/UL (ref 1.8–8.9)
NEUTROPHILS NFR BLD AUTO: 70.6 % (ref 43–81)
NEUTS BAND NFR BLD MANUAL: 2 % (ref 0–5)
NEUTS SEG NFR BLD MANUAL: 84 % (ref 42–76)
PLATELET # BLD AUTO: 159 K/UL (ref 150–450)
POTASSIUM SERPL-SCNC: 3.5 MMOL/L (ref 3.5–5.1)
RBC # BLD AUTO: 2.44 MIL/UL (ref 4–5.2)
SODIUM SERPL-SCNC: 146 MMOL/L (ref 136–145)
WBC NRBC COR # BLD AUTO: 6.1 K/UL (ref 4.3–11)

## 2022-08-16 RX ADMIN — Medication SCH MG: at 21:29

## 2022-08-16 RX ADMIN — MEGESTROL ACETATE SCH MG: 400 SUSPENSION ORAL at 08:17

## 2022-08-16 RX ADMIN — METOPROLOL SUCCINATE SCH MG: 50 TABLET, EXTENDED RELEASE ORAL at 08:18

## 2022-08-16 RX ADMIN — FOLIC ACID SCH MG: 1 TABLET ORAL at 08:17

## 2022-08-16 RX ADMIN — SODIUM CHLORIDE SCH MLS/HR: 9 INJECTION, SOLUTION INTRAVENOUS at 15:34

## 2022-08-16 RX ADMIN — Medication SCH ML: at 08:19

## 2022-08-16 RX ADMIN — Medication SCH ML: at 16:37

## 2022-08-16 RX ADMIN — MEGESTROL ACETATE SCH MG: 400 SUSPENSION ORAL at 16:37

## 2022-08-16 RX ADMIN — Medication SCH ML: at 11:44

## 2022-08-16 RX ADMIN — Medication SCH MCG: at 08:17

## 2022-08-16 RX ADMIN — Medication SCH TAB: at 08:17

## 2022-08-16 RX ADMIN — Medication SCH MG: at 08:18

## 2022-08-16 RX ADMIN — Medication SCH ML: at 08:17

## 2022-08-16 NOTE — NUR
RN OPENING NOTE



PATIENT AWAKE IN BED RESTING. A/O X 2. NO PAIN NOTED AT THIS TIME. ON ROOM AIR, NO DISTRESS 
OR SHORTNESS OF BREATH NOTED. IV ACCESS SHANITA MIDLINE, INTACT, PATENT AND FLUSHING WELL. FALL 
AND SAFETY MEASURES IN PLACE, BED ALARM ON, BED IN LOW AND LOCK POSITION, CALL LIGHT AND 
TABLE WITHIN EASY REACH, SIDE RAILS UP X2. WILL CONTINUE TO MONITOR.

## 2022-08-16 NOTE — NUR
RN CLOSING NOTE;



PATIENT IN BED AAOX2.ON ROOM AIR LELAND WELL NO SIGN SOB/DISTRESS NOTED.BREATHING EVEN AND 
UNLABORED.DUE MEDS GIVEN AS ORDER.IV ACCESS SHANITA MIDLINE,INTACT, PATENT AND FLUSHING WELL.ALL 
NEEDS ATTENDED. FALL AND SAFETY MEASURES IN PLACE, BED ALARM ON, BED IN LOW AND LOCK 
POSITION, CALL LIGHT AND TABLE WITHIN EASY REACH, SIDE RAILS UP X2. WILL ENDORSED TO NEXT 
SHIFT

## 2022-08-16 NOTE — NUR
RN CLOSING NOTE



PATIENT AWAKE IN BED RESTING. A/O X 2. NO PAIN NOTED AT THIS TIME. ON ROOM AIR, NO DISTRESS 
OR SHORTNESS OF BREATH NOTED. IV ACCESS SHANITA MIDLINE, INTACT, PATENT AND FLUSHING WELL. ALL 
SCHEDULE MEDICATIONS ADMINISTERED. PATIENT WAS TURNED AND REPOSITIONED PER PROTOCOL. FALL 
AND SAFETY MEASURES IN PLACE, BED ALARM ON, BED IN LOW AND LOCK POSITION, CALL LIGHT AND 
TABLE WITHIN EASY REACH, SIDE RAILS UP X2. WILL ENDORSE TO NIGHT SHIFT.

## 2022-08-17 VITALS — DIASTOLIC BLOOD PRESSURE: 53 MMHG | SYSTOLIC BLOOD PRESSURE: 114 MMHG

## 2022-08-17 VITALS — SYSTOLIC BLOOD PRESSURE: 115 MMHG | DIASTOLIC BLOOD PRESSURE: 67 MMHG

## 2022-08-17 VITALS — DIASTOLIC BLOOD PRESSURE: 56 MMHG | SYSTOLIC BLOOD PRESSURE: 130 MMHG

## 2022-08-17 LAB
HCT VFR BLD AUTO: 21 % (ref 33–45)
HGB BLD-MCNC: 6.1 G/DL (ref 11.5–14.8)

## 2022-08-17 RX ADMIN — Medication SCH MG: at 21:33

## 2022-08-17 RX ADMIN — SODIUM CHLORIDE SCH MLS/HR: 9 INJECTION, SOLUTION INTRAVENOUS at 14:44

## 2022-08-17 RX ADMIN — Medication SCH TAB: at 09:09

## 2022-08-17 RX ADMIN — MEGESTROL ACETATE SCH MG: 400 SUSPENSION ORAL at 17:01

## 2022-08-17 RX ADMIN — FOLIC ACID SCH MG: 1 TABLET ORAL at 09:09

## 2022-08-17 RX ADMIN — Medication SCH MG: at 09:10

## 2022-08-17 RX ADMIN — METOPROLOL SUCCINATE SCH MG: 50 TABLET, EXTENDED RELEASE ORAL at 09:10

## 2022-08-17 RX ADMIN — Medication SCH ML: at 09:08

## 2022-08-17 RX ADMIN — Medication SCH ML: at 09:11

## 2022-08-17 RX ADMIN — Medication SCH ML: at 17:01

## 2022-08-17 RX ADMIN — MEGESTROL ACETATE SCH MG: 400 SUSPENSION ORAL at 09:09

## 2022-08-17 RX ADMIN — Medication SCH MCG: at 09:09

## 2022-08-17 RX ADMIN — Medication SCH ML: at 12:01

## 2022-08-17 NOTE — NUR
CRITICAL VALUE

[place]/lab called, Hemoglobin 6.1 results slowly trending up. Patient is Jehovah Witness, 
refusing blood transfusion per records. Will endorse to oncoming RN.

## 2022-08-17 NOTE — NUR
END OF SHIFT REPORT

Patient in bed, stable on room air. No c/o pain, no N/V. SHANITA midline intact. Incontinence 
care done, turned and repositioned, offloading. H/H slowly improving, on Iron IV, Epogen. No 
active bleeding. Fall precaution maintained. Will endorse to oncoming RN.

## 2022-08-18 VITALS — DIASTOLIC BLOOD PRESSURE: 60 MMHG | SYSTOLIC BLOOD PRESSURE: 120 MMHG

## 2022-08-18 VITALS — SYSTOLIC BLOOD PRESSURE: 121 MMHG | DIASTOLIC BLOOD PRESSURE: 58 MMHG

## 2022-08-18 VITALS — SYSTOLIC BLOOD PRESSURE: 87 MMHG | DIASTOLIC BLOOD PRESSURE: 37 MMHG

## 2022-08-18 LAB
HCT VFR BLD AUTO: 21 % (ref 33–45)
HGB BLD-MCNC: 6 G/DL (ref 11.5–14.8)

## 2022-08-18 RX ADMIN — FOLIC ACID SCH MG: 1 TABLET ORAL at 09:28

## 2022-08-18 RX ADMIN — Medication SCH MG: at 09:29

## 2022-08-18 RX ADMIN — Medication SCH ML: at 09:31

## 2022-08-18 RX ADMIN — Medication SCH ML: at 09:30

## 2022-08-18 RX ADMIN — MEGESTROL ACETATE SCH MG: 400 SUSPENSION ORAL at 16:07

## 2022-08-18 RX ADMIN — Medication SCH ML: at 11:59

## 2022-08-18 RX ADMIN — Medication SCH MCG: at 09:28

## 2022-08-18 RX ADMIN — Medication SCH ML: at 16:08

## 2022-08-18 RX ADMIN — Medication SCH TAB: at 09:28

## 2022-08-18 RX ADMIN — Medication SCH MG: at 22:12

## 2022-08-18 RX ADMIN — METOPROLOL SUCCINATE SCH MG: 50 TABLET, EXTENDED RELEASE ORAL at 09:00

## 2022-08-18 RX ADMIN — MEGESTROL ACETATE SCH MG: 400 SUSPENSION ORAL at 09:29

## 2022-08-18 NOTE — NUR
MS RN CLOSING NOTES

PATIENT ON BED AWAKE AND A/O X4. ON ROOM AIR TOLERATING WELL. NO SOB NOTED. NOT IN DISTRESS. 
WITH NO COMPLAINTS OF PAIN OR DISCOMFORT AT THIS TIME BUT REFUSED TO EAT HER DINNER DUE TO 
DECREASED APPETITE. WITH IV ACCESS AT THE RIGHT UPPER ARM MIDLINE, SALINE LOCKED, PATENT AND 
INTACT. DUE MEDS GIVEN. SAFETY MEASURES IN PLACED. CALL LIGHT WITHIN REACH. BED ON LOWEST 
LOCKED POSITION, SIDE RAILS UP X2. WILL ENDORSE TO NEXT SHIFT FOR MADELYN.

## 2022-08-18 NOTE — NUR
MS RN OPENING NOTES

RECEIVED PATIENT ON BED AWAKE AND A/O X4. ON ROOM AIR TOLERATING WELL. NO SOB NOTED. NOT IN 
DISTRESS. WITH NO COMPLAINTS OF PAIN OR DISCOMFORT AT THIS TIME. WITH IV ACCESS AT THE RIGHT 
UPPER ARM MIDLINE, SALINE LOCKED, PATENT AND INTACT. SAFETY MEASURES IN PLACED. CALL LIGHT 
WITHIN REACH. BED ON LOWEST LOCKED POSITION, SIDE RAILS UP X2. WILL CONTINUE TO MONITOR.

## 2022-08-19 VITALS — SYSTOLIC BLOOD PRESSURE: 137 MMHG | DIASTOLIC BLOOD PRESSURE: 62 MMHG

## 2022-08-19 VITALS — SYSTOLIC BLOOD PRESSURE: 136 MMHG | DIASTOLIC BLOOD PRESSURE: 69 MMHG

## 2022-08-19 VITALS — DIASTOLIC BLOOD PRESSURE: 59 MMHG | SYSTOLIC BLOOD PRESSURE: 112 MMHG

## 2022-08-19 LAB
HCT VFR BLD AUTO: 22 % (ref 33–45)
HGB BLD-MCNC: 6.4 G/DL (ref 11.5–14.8)
IRON SERPL-MCNC: 40 UG/DL (ref 50–175)
TIBC SERPL-MCNC: 163 UG/DL (ref 250–450)

## 2022-08-19 RX ADMIN — Medication SCH ML: at 12:22

## 2022-08-19 RX ADMIN — MEGESTROL ACETATE SCH MG: 400 SUSPENSION ORAL at 16:44

## 2022-08-19 RX ADMIN — Medication SCH ML: at 17:12

## 2022-08-19 RX ADMIN — Medication SCH MCG: at 08:25

## 2022-08-19 RX ADMIN — Medication SCH TAB: at 08:25

## 2022-08-19 RX ADMIN — Medication SCH MG: at 21:06

## 2022-08-19 RX ADMIN — METOPROLOL SUCCINATE SCH MG: 50 TABLET, EXTENDED RELEASE ORAL at 08:25

## 2022-08-19 RX ADMIN — FOLIC ACID SCH MG: 1 TABLET ORAL at 08:26

## 2022-08-19 RX ADMIN — MEGESTROL ACETATE SCH MG: 400 SUSPENSION ORAL at 08:24

## 2022-08-19 RX ADMIN — Medication SCH ML: at 08:01

## 2022-08-19 RX ADMIN — Medication SCH MG: at 08:26

## 2022-08-19 NOTE — NUR
MS RN OPENING NOTES:





RECEIVED PATIENT IN BED ASLEEP, EASILY AROUSED WITH STIMULI. ALERT AND ORIENTED X 2, ABLE TO 
VERBALIZED NEEDS. NO SOB OR CARDIAC DISTRESS NOTED. PATIENT APPEARED WEAK AND PALE. NOTED 
WITH IV ACCESS ON SHANITA MIDLINE PATENT AND INTACT. SAFETY PRECAUTIONS MAINTAINED: BED LOCKED 
AND IN LOWEST POSITION, SIDE RAILS UP X 2, CALL LIGHT IN EASY REACH FOR HELP. WILL MONITOR 
PATIENT FOR ANY SIGNIFICANT CHANGES.

## 2022-08-19 NOTE — NUR
RN NOTES

PATIENT ON BED RESTING. NO SOB NOTED. NOT IN DISTRESS. WITH NO COMPLAINTS OF PAIN OR 
DISCOMFORT AT THIS TIME.. SAFETY MEASURES IN PLACED. CALL LIGHT WITHIN REACH. BED ON LOWEST 
LOCKED POSITION, SIDE RAILS UP X2. WILL ENDORSE TO NEXT SHIFT FOR MADELYN.

## 2022-08-19 NOTE — NUR
RN NOTES:



OFFERED ENSURE, PATIENT HAD FEW SIPS ONLY. BED BATH DONE AND APPLIED MEFILEX TO SACRUM.

## 2022-08-19 NOTE — NUR
MS RN OPENING NOTES:



PATIENT IN BED AWAKE. ALERT AND ORIENTED X 2, ABLE TO VERBALIZED NEEDS. NO SOB OR CARDIAC 
DISTRESS NOTED. PATIENT APPEARED WEAK AND PALE. NOTED WITH IV ACCESS ON SHANITA MIDLINE PATENT 
AND INTACT. SAFETY PRECAUTIONS MAINTAINED: BED LOCKED AND IN LOWEST POSITION, SIDE RAILS UP 
X 2, CALL LIGHT IN EASY REACH FOR HELP. WILL MONITOR PATIENT FOR ANY SIGNIFICANT CHANGES.

## 2022-08-19 NOTE — NUR
MS RN CLOSING NOTES:



PATIENT IN BED AWAKE. ALERT AND ORIENTED X 2, ABLE TO VERBALIZED NEEDS. NO SOB OR CARDIAC 
DISTRESS NOTED. PATIENT APPEARED WEAK AND PALE. ENCOURAGED TO EAT MEALS, HAD FEW BITES AND 
SIPS. NOTED WITH IV ACCESS ON SHANITA MIDLINE PATENT AND INTACT. SAFETY PRECAUTIONS MAINTAINED: 
BED LOCKED AND IN LOWEST POSITION, SIDE RAILS UP X 2, CALL LIGHT IN EASY REACH FOR HELP. 
WILL MONITOR PATIENT FOR ANY SIGNIFICANT CHANGES. ENDORSED TO NIGHT SHIFT FOR CONTINUITY OF 
CARE.

## 2022-08-20 VITALS — DIASTOLIC BLOOD PRESSURE: 54 MMHG | SYSTOLIC BLOOD PRESSURE: 138 MMHG

## 2022-08-20 VITALS — SYSTOLIC BLOOD PRESSURE: 124 MMHG | DIASTOLIC BLOOD PRESSURE: 57 MMHG

## 2022-08-20 VITALS — SYSTOLIC BLOOD PRESSURE: 129 MMHG | DIASTOLIC BLOOD PRESSURE: 69 MMHG

## 2022-08-20 LAB
HCT VFR BLD AUTO: 25 % (ref 33–45)
HGB BLD-MCNC: 6.7 G/DL (ref 11.5–14.8)

## 2022-08-20 RX ADMIN — METOPROLOL SUCCINATE SCH MG: 50 TABLET, EXTENDED RELEASE ORAL at 09:26

## 2022-08-20 RX ADMIN — MEGESTROL ACETATE SCH MG: 400 SUSPENSION ORAL at 09:25

## 2022-08-20 RX ADMIN — Medication SCH ML: at 09:25

## 2022-08-20 RX ADMIN — Medication SCH MG: at 09:25

## 2022-08-20 RX ADMIN — Medication SCH MG: at 22:10

## 2022-08-20 RX ADMIN — Medication SCH TAB: at 09:25

## 2022-08-20 RX ADMIN — Medication SCH ML: at 12:02

## 2022-08-20 RX ADMIN — FOLIC ACID SCH MG: 1 TABLET ORAL at 09:25

## 2022-08-20 RX ADMIN — Medication SCH ML: at 16:50

## 2022-08-20 RX ADMIN — MEGESTROL ACETATE SCH MG: 400 SUSPENSION ORAL at 16:34

## 2022-08-20 RX ADMIN — Medication SCH MCG: at 09:25

## 2022-08-20 RX ADMIN — SODIUM CHLORIDE SCH MLS/HR: 9 INJECTION, SOLUTION INTRAVENOUS at 15:35

## 2022-08-20 NOTE — NUR
MS RN CLOSING NOTES:



PATIENT IN BED AWAKE. ALERT AND ORIENTED X 2, ABLE TO VERBALIZED NEEDS. NO SOB OR CARDIAC 
DISTRESS NOTED. PATIENT APPEARED WEAK AND PALE. NOTED WITH IV ACCESS ON SHANITA MIDLINE PATENT 
AND INTACT. SAFETY PRECAUTIONS MAINTAINED: BED LOCKED AND IN LOWEST POSITION, SIDE RAILS UP 
X 2, CALL LIGHT IN EASY REACH FOR HELP. WILL ENDORSE CRAE TO DYA SHIFT NURSE.

## 2022-08-20 NOTE — NUR
MS RN OPENING NOTE 



PATIENT AWAKE IN BED, ALERT/ORIENTED X 2. PATIENT STABLE ON RA, NO S/S OF DISTRESS OR SOB 
NOTED, BREATHING EVEN AND UNLABORED. SHANITA MIDLINE INTACT AND SALINE LOCKED. PATIENT RESTING 
AND DOESN'T APPEAR IN PAIN OR DISTRESS. SAFETY MEASURES IN PLACE: CALL LIGHT WITHIN REACH, 
SIDE RAILS UP X 3, BED LOCKED IN LOWEST POSITION, BED ALARM ON. WILL CONTINUE TO MONITOR 
PATIENT

## 2022-08-20 NOTE — NUR
MS RN CLOSING NOTES

PATIENT ON BED RESTING AND A/O X4. ON ROOM AIR TOLERATING WELL. NO SOB NOTED. NOT IN 
DISTRESS. WITH NO COMPLAINTS OF PAIN OR DISCOMFORT AT THIS TIME. WITH IV ACCESS AT THE RIGHT 
UPPER ARM MIDLINE, SALINE LOCKED, PATENT AND INTACT. DUE MEDS GIVEN. SAFETY MEASURES IN 
PLACED. CALL LIGHT WITHIN REACH. BED ON LOWEST LOCKED POSITION, SIDE RAILS UP X2. WILL 
ENDORSE TO NEXT SHIFT FOR MADELYN.

## 2022-08-21 VITALS — SYSTOLIC BLOOD PRESSURE: 113 MMHG | DIASTOLIC BLOOD PRESSURE: 47 MMHG

## 2022-08-21 VITALS — DIASTOLIC BLOOD PRESSURE: 47 MMHG | SYSTOLIC BLOOD PRESSURE: 113 MMHG

## 2022-08-21 LAB
HCT VFR BLD AUTO: 23 % (ref 33–45)
HGB BLD-MCNC: 6.7 G/DL (ref 11.5–14.8)

## 2022-08-21 RX ADMIN — MEGESTROL ACETATE SCH MG: 400 SUSPENSION ORAL at 16:30

## 2022-08-21 RX ADMIN — Medication SCH ML: at 11:06

## 2022-08-21 RX ADMIN — METOPROLOL SUCCINATE SCH MG: 50 TABLET, EXTENDED RELEASE ORAL at 08:32

## 2022-08-21 RX ADMIN — Medication SCH MG: at 08:31

## 2022-08-21 RX ADMIN — Medication SCH TAB: at 08:32

## 2022-08-21 RX ADMIN — Medication SCH MCG: at 08:32

## 2022-08-21 RX ADMIN — MEGESTROL ACETATE SCH MG: 400 SUSPENSION ORAL at 08:31

## 2022-08-21 RX ADMIN — FOLIC ACID SCH MG: 1 TABLET ORAL at 08:31

## 2022-08-21 RX ADMIN — Medication SCH MG: at 21:09

## 2022-08-21 RX ADMIN — Medication SCH ML: at 16:31

## 2022-08-21 RX ADMIN — SODIUM CHLORIDE SCH MLS/HR: 9 INJECTION, SOLUTION INTRAVENOUS at 14:05

## 2022-08-21 RX ADMIN — Medication SCH ML: at 07:47

## 2022-08-21 NOTE — NUR
MS RN NOTES



PATIENT STILL NOT ABLE TO DRINK MUCH FLUID; PER DR. VILLALBA, CONTINUE TO ENCOURAGE PATIENT TO 
DRINK FLUIDS OR MAY NEED NGT INSERTION TO GIVE THE GOLYTELY SOLUTION; CHARGE AWARE

## 2022-08-21 NOTE — NUR
MS RN NOTES



PER DR. VILLALBA, ATTEMPT TO HAVE PATIENT DRINK, SIT IN PATIENT ROOM AND CALL BROTHER TO 
ENCOURAGE HER TO DRINK THE GOLYTELY. BROTHER NOT ANSWERING, PATIENT DRANK AT LEAST 2 CUPS OF 
THE FLUIDS; WILL ATTEMPT TO HAVE PATIENT DRINK AS MUCH AS SHE CAN THROUGHOUT SHIFT; CHARGE 
AWARE;

## 2022-08-21 NOTE — NUR
MS RN NOTES



ATTEMPTED TO CALL SID TAYLOR (BROTHER), NO ANSWER, ANOTHER VOICEMAIL WAS LEFT TO CALL BACK. 
WILL AWAIT CALL BACK, CHARGE AWARE

## 2022-08-21 NOTE — NUR
MS RN CLOSING



PATIENT LAYING IN BED, A/O X 2, TOLERATING WELL ON ROOM AIR WITH NO S/S RESPIRATORY 
DISTRESS. NO COMPLAINTS OF PAIN OR DISCOMFORT AT THIS TIME. SHANITA MIDLINE SALINE LOCKED. 
SAFETY MEASURES IN PLACE: BED IN LOWEST LOCKED POSITION, SIDE RAILS UP X 2, CALL LIGHT 
WITHIN REACH. ALL NEEDS MET. WILL ENDORSE TO NIGHT SHIFT FOR MADELYN.

## 2022-08-21 NOTE — NUR
MS RN NOTES



ORDER RECEIVED FOR ULTRASOUND GUIDED NEEDLE BIOPSY. VOICEMAIL LEFT WITH PATIENT'S DPOA 
(BROTHER) FOR TELEPHONE CONSENT. AWAITING RETURN CALL FROM [facility] FOR CONSENT.

## 2022-08-21 NOTE — NUR
MS RN OPENING NOTES



PATIENT LAYING IN BED, A/O X 2, TOLERATING WELL ON ROOM AIR WITH NO S/S RESPIRATORY 
DISTRESS. NO COMPLAINTS OF PAIN OR DISCOMFORT AT THIS TIME. SHANITA MIDLINE SALINE LOCKED, 
CLEAN, INTACT, AND FLUSHING WELL. SAFETY MEASURES IN PLACE: BED IN LOWEST LOCKED POSITION, 
SIDE RAILS UP X 2, CALL LIGHT WITHIN REACH. WILL CONTINUE TO MONITOR.

## 2022-08-21 NOTE — NUR
MS RN CLOSING NOTE 



PATIENT SLEEPING IN BED, ALERT/ORIENTED X 2. PATIENT STABLE ON RA, NO S/S OF DISTRESS OR SOB 
NOTED, BREATHING EVEN AND UNLABORED. SHANITA MIDLINE INTACT AND SALINE LOCKED. PATIENT SLEPT 
WELL THROUGH THE NIGHT, NO SIGNIFICANT CHANGES. MEDICATIONS GIVEN AS ORDERED, PT NEEDS MET 
THROUGHOUT SHIFT, PT TURNED Q2H.  SAFETY MEASURES IN PLACE: CALL LIGHT WITHIN REACH, SIDE 
RAILS UP X 3, BED LOCKED IN LOWEST POSITION, BED ALARM ON. WILL ENDORSE TO DAY SHIFT NURSE 
FOR CONTINUITY OF CARE

## 2022-08-22 VITALS — DIASTOLIC BLOOD PRESSURE: 67 MMHG | SYSTOLIC BLOOD PRESSURE: 129 MMHG

## 2022-08-22 VITALS — DIASTOLIC BLOOD PRESSURE: 40 MMHG | SYSTOLIC BLOOD PRESSURE: 112 MMHG

## 2022-08-22 VITALS — DIASTOLIC BLOOD PRESSURE: 66 MMHG | SYSTOLIC BLOOD PRESSURE: 127 MMHG

## 2022-08-22 VITALS — DIASTOLIC BLOOD PRESSURE: 56 MMHG | SYSTOLIC BLOOD PRESSURE: 138 MMHG

## 2022-08-22 LAB
BASOPHILS # BLD AUTO: 0 K/UL (ref 0–0.2)
BASOPHILS NFR BLD AUTO: 0.7 % (ref 0–2)
BILIRUB UR QL STRIP: NEGATIVE
BUN SERPL-MCNC: 16 MG/DL (ref 7–18)
CALCIUM SERPL-MCNC: 8.2 MG/DL (ref 8.5–10.1)
CHLORIDE SERPL-SCNC: 122 MMOL/L (ref 98–107)
CO2 SERPL-SCNC: 23 MMOL/L (ref 21–32)
COLOR UR: YELLOW
CREAT SERPL-MCNC: 0.8 MG/DL (ref 0.6–1.3)
DEPRECATED SQUAMOUS URNS QL MICRO: (no result) /HPF
EOSINOPHIL NFR BLD AUTO: 0 % (ref 0–6)
GLUCOSE SERPL-MCNC: 96 MG/DL (ref 74–106)
GLUCOSE UR STRIP-MCNC: NEGATIVE MG/DL
HCT VFR BLD AUTO: 24 % (ref 33–45)
HGB BLD-MCNC: 7.1 G/DL (ref 11.5–14.8)
LEUKOCYTE ESTERASE UR QL STRIP: NEGATIVE
LYMPHOCYTES NFR BLD AUTO: 1.4 K/UL (ref 0.8–4.8)
LYMPHOCYTES NFR BLD AUTO: 22.4 % (ref 20–44)
LYMPHOCYTES NFR BLD MANUAL: 13 % (ref 16–48)
MCHC RBC AUTO-ENTMCNC: 30 G/DL (ref 31–36)
MCV RBC AUTO: 79 FL (ref 82–100)
MONOCYTES NFR BLD AUTO: 0.4 K/UL (ref 0.1–1.3)
MONOCYTES NFR BLD AUTO: 5.9 % (ref 2–12)
MONOCYTES NFR BLD MANUAL: 2 % (ref 0–11)
NEUTROPHILS # BLD AUTO: 4.4 K/UL (ref 1.8–8.9)
NEUTROPHILS NFR BLD AUTO: 71 % (ref 43–81)
NEUTS SEG NFR BLD MANUAL: 85 % (ref 42–76)
NITRITE UR QL STRIP: NEGATIVE
PH UR STRIP: 5.5 [PH] (ref 5–8)
PLATELET # BLD AUTO: 215 K/UL (ref 150–450)
POTASSIUM SERPL-SCNC: 3 MMOL/L (ref 3.5–5.1)
PROT UR QL STRIP: 30 MG/DL
RBC # BLD AUTO: 3.05 MIL/UL (ref 4–5.2)
RBC #/AREA URNS HPF: (no result) /HPF (ref 0–2)
SODIUM SERPL-SCNC: 154 MMOL/L (ref 136–145)
UROBILINOGEN UR STRIP-MCNC: 0.2 EU/DL
WBC #/AREA URNS HPF: (no result) /HPF
WBC #/AREA URNS HPF: (no result) /HPF (ref 0–3)
WBC NRBC COR # BLD AUTO: 6.2 K/UL (ref 4.3–11)

## 2022-08-22 RX ADMIN — POTASSIUM CHLORIDE SCH MEQ: 200 INJECTION, SOLUTION INTRAVENOUS at 08:40

## 2022-08-22 RX ADMIN — Medication SCH ML: at 12:00

## 2022-08-22 RX ADMIN — MEGESTROL ACETATE SCH MG: 400 SUSPENSION ORAL at 17:00

## 2022-08-22 RX ADMIN — POTASSIUM CHLORIDE SCH MEQ: 200 INJECTION, SOLUTION INTRAVENOUS at 09:55

## 2022-08-22 RX ADMIN — Medication SCH MG: at 08:43

## 2022-08-22 RX ADMIN — Medication SCH MG: at 21:49

## 2022-08-22 RX ADMIN — Medication SCH ML: at 07:32

## 2022-08-22 RX ADMIN — Medication SCH ML: at 17:00

## 2022-08-22 RX ADMIN — MEGESTROL ACETATE SCH MG: 400 SUSPENSION ORAL at 08:41

## 2022-08-22 RX ADMIN — FOLIC ACID SCH MG: 1 TABLET ORAL at 08:41

## 2022-08-22 RX ADMIN — METOPROLOL SUCCINATE SCH MG: 50 TABLET, EXTENDED RELEASE ORAL at 08:43

## 2022-08-22 RX ADMIN — POTASSIUM CHLORIDE SCH MEQ: 200 INJECTION, SOLUTION INTRAVENOUS at 11:24

## 2022-08-22 RX ADMIN — POTASSIUM CHLORIDE SCH MEQ: 200 INJECTION, SOLUTION INTRAVENOUS at 12:34

## 2022-08-22 RX ADMIN — Medication SCH MCG: at 08:41

## 2022-08-22 RX ADMIN — SODIUM CHLORIDE SCH MLS/HR: 9 INJECTION, SOLUTION INTRAVENOUS at 14:20

## 2022-08-22 RX ADMIN — Medication SCH TAB: at 08:41

## 2022-08-22 RX ADMIN — SODIUM CHLORIDE, SODIUM LACTATE, POTASSIUM CHLORIDE, AND CALCIUM CHLORIDE SCH MLS/HR: .6; .31; .03; .02 INJECTION, SOLUTION INTRAVENOUS at 22:30

## 2022-08-22 RX ADMIN — SODIUM CHLORIDE, SODIUM LACTATE, POTASSIUM CHLORIDE, AND CALCIUM CHLORIDE SCH MLS/HR: .6; .31; .03; .02 INJECTION, SOLUTION INTRAVENOUS at 09:04

## 2022-08-22 NOTE — NUR
MS RN OPENING NOTES:



RECEIVED PATIENT IN BED ASLEEP, EASILY AROUSED WITH STIMULI. ALERT AND ORIENTED X 2, ABLE TO 
VERBALIZED NEEDS. NO SOB OR CARDIAC DISTRESS NOTED. PATIENT APPEARED WEAK AND PALE. NOTED 
WITH IV ACCESS ON SHANITA MIDLINE g#18 PATENT AND INTACT. SAFETY PRECAUTIONS MAINTAINED: BED 
LOCKED AND IN LOWEST POSITION, SIDE RAILS UP X 2, CALL LIGHT IN EASY REACH FOR HELP. WILL 
MONITOR PATIENT FOR ANY SIGNIFICANT CHANGES. KEPT CLEAN, DRY AND COMFORTABLE.

## 2022-08-22 NOTE — NUR
RN NOTES:



SEE AND EXAMINED BY DR VILLALBA, DR VILLALBA VERBALIZED NGT INSERTION. DR VILLALBA AT BED SIDE WHEN 
ATTEMPTED TO INSERT NG TUBE, PT REFUSED, WITHDRAWING THE TUBE AND SHE'S CRYING.

## 2022-08-22 NOTE — NUR
MS RN CLOSING NOTES



PATIENT RESTING IN BED COMFORTABLY; A/OX2, BREATHING EVEN AND UNLABORED; TOLERATING ROOM AIR 
WELL; NO SOB NOTED; PATIENT DID NOT FINISH GOLYTELY; CHARGE AWARE; BROTHER SID STILL HAS 
NOT RETURNED CALL; PHONE GOES STRAIGHT TO VOICEMAIL, NO OTHER NUMBER ON FILE; SEVERAL 
VOICEMAILS LEFT; NO CONSENTS SIGNED; SHANITA MIDLINE S/L, INTACT AND PATENT, FLUSHING WELL; NO 
S/S REDNESS OR INFILTRATION NOTED; ALL NEEDS RENDERED; SAFETY PRECAUTIONS IMPLEMENTED; BED 
LOCKED IN LOW POSITION; SIDE RAILSX2, CALL LIGHT WITHIN REACH; WILL CONT PLAN OF CARE 
ENDORSE MADELYN TO ONCOMING SHIFT

## 2022-08-22 NOTE — NUR
MS RN NOTES



PATIENT REFUSING TO DRINK GOLYTLY; DPOA BROTHER CLAUDIA STILL NOT ANSWERING AND HAS NOT CALLED 
BACK; CHARGE NURSE AWARE; SKIN ASSESSMENT DONE AND PICTURES TAKEN, FILED IN PATIENT BINDER;

## 2022-08-22 NOTE — NUR
MS RN OPENING NOTES



RECEIVED PATIENT RESTING IN BED COMFORTABLY; A/OX2, BREATHING EVEN AND UNLABORED; TOLERATING 
ROOM AIR WELL; NO SOB NOTED; PER AM SHIFT; SHANITA MIDLINE, INTACT AND PATENT, FLUSHING WELL; NO 
S/S REDNESS OR INFILTRATION NOTED; TOLERATING IVF WELL; PER AM SHIFT, DPOA BROTHER CAME TO 
UNIT, DISCUSSING WITH FAMILY REGARDING POSSIBLE PROCEDURES; NO CONSENTS SIGNED AT THIS TIME; 
SAFETY PRECAUTIONS IMPLEMENTED; BED LOCKED IN LOW POSITION; SIDE RAILSX2, CALL LIGHT WITHIN 
REACH; WILL CONT PLAN OF CARE

## 2022-08-22 NOTE — NUR
RN NOTES:



RECEIVED AN ORDER FROM DR GATICA, ORDERED SORBITOL 70% 60ML EVERY 6HOURS. ORDERS NOTED AND 
CARRIED OUT.

## 2022-08-22 NOTE — NUR
RN NOTES:



AS PER DR VILLALBA AS DR GRANADOS FOR ALTERNATIVES FOR BOWEL PREP,  FAILED NG TUBE. CALLED DR GRANADOS, AS PER DR GRANADOS CALL DR GATICA, DR VILLALBA STATED OKAY FOR DR GATICA.





INFORMED DR GATICA VIA TEXT MESSAGE WAITING FOR RESPONSE.

## 2022-08-22 NOTE — NUR
MS RN CLOSING NOTES:



PATIENT IN BED ASLEEP AND EASILY AROUSED WITH STIMULI. NO SOB OR CARDIAC DISTRESS NOTED, 
AFEBRILE. PALE AND WEAK IN APPEARANCE. IV ACCESS ON SHANITA MIDLINE GAUGE 18 PATENT AND INTACT 
AND INFUSING D5W KCL 20MEQ 1L X 75ML/ HOUR, PATIENT ON CLEAR LIQUID DIET. SAFETY PRECAUTIONS 
MAINTAINED: BED LOCKED AND IN LOWEST POSITION, SIDE RAILS UP X 2. CALL LIGHT IN EASY REACH 
FOR HELP OR ASSISTANCE. ENDORSED TO NIGHT SHIFT FOR CONTINUITY OF CARE.

## 2022-08-22 NOTE — NUR
MS RN NOTES



SPOKE WITH NEL,  WORKING ON PATIENT'S CASE TO TRANSFER TO [facility], 
PER NEL, NO BED AVAILABLE YET BUT WILL STILL PROCESS APPLICATION; PER NEL, NEED COVID19 
RESULTS FROM ADMISSION; CHARGE NURSE AWARE; FAXED RESULT; AWAITING FOR CALL BACK FROM NEL 
TO CONFIRM SHE RECEIVED THE RESULTS AND TO CONFIRM PATIENT'S APPLICATION FOR TRANSFER TO 
[facility] IS IN PROCESS;

## 2022-08-23 VITALS — DIASTOLIC BLOOD PRESSURE: 40 MMHG | SYSTOLIC BLOOD PRESSURE: 110 MMHG

## 2022-08-23 VITALS — DIASTOLIC BLOOD PRESSURE: 55 MMHG | SYSTOLIC BLOOD PRESSURE: 134 MMHG

## 2022-08-23 LAB
BASOPHILS # BLD AUTO: 0 K/UL (ref 0–0.2)
BASOPHILS NFR BLD AUTO: 0.4 % (ref 0–2)
BUN SERPL-MCNC: 14 MG/DL (ref 7–18)
CALCIUM SERPL-MCNC: 7.8 MG/DL (ref 8.5–10.1)
CHLORIDE SERPL-SCNC: 121 MMOL/L (ref 98–107)
CO2 SERPL-SCNC: 25 MMOL/L (ref 21–32)
CREAT SERPL-MCNC: 0.7 MG/DL (ref 0.6–1.3)
EOSINOPHIL NFR BLD AUTO: 0 % (ref 0–6)
GLUCOSE SERPL-MCNC: 111 MG/DL (ref 74–106)
HCT VFR BLD AUTO: 26 % (ref 33–45)
HGB BLD-MCNC: 7.6 G/DL (ref 11.5–14.8)
LYMPHOCYTES NFR BLD AUTO: 1.3 K/UL (ref 0.8–4.8)
LYMPHOCYTES NFR BLD AUTO: 21.2 % (ref 20–44)
MCHC RBC AUTO-ENTMCNC: 29 G/DL (ref 31–36)
MCV RBC AUTO: 80 FL (ref 82–100)
MONOCYTES NFR BLD AUTO: 0.4 K/UL (ref 0.1–1.3)
MONOCYTES NFR BLD AUTO: 5.9 % (ref 2–12)
NEUTROPHILS # BLD AUTO: 4.4 K/UL (ref 1.8–8.9)
NEUTROPHILS NFR BLD AUTO: 72.5 % (ref 43–81)
PLATELET # BLD AUTO: 202 K/UL (ref 150–450)
POTASSIUM SERPL-SCNC: 3 MMOL/L (ref 3.5–5.1)
RBC # BLD AUTO: 3.28 MIL/UL (ref 4–5.2)
SODIUM SERPL-SCNC: 153 MMOL/L (ref 136–145)
WBC NRBC COR # BLD AUTO: 6.1 K/UL (ref 4.3–11)

## 2022-08-23 PROCEDURE — 05HC33Z INSERTION OF INFUSION DEVICE INTO LEFT BASILIC VEIN, PERCUTANEOUS APPROACH: ICD-10-PCS | Performed by: NURSE PRACTITIONER

## 2022-08-23 RX ADMIN — METOPROLOL SUCCINATE SCH MG: 50 TABLET, EXTENDED RELEASE ORAL at 09:14

## 2022-08-23 RX ADMIN — Medication SCH MCG: at 09:13

## 2022-08-23 RX ADMIN — Medication SCH MG: at 09:13

## 2022-08-23 RX ADMIN — METOPROLOL SUCCINATE SCH MG: 50 TABLET, EXTENDED RELEASE ORAL at 09:00

## 2022-08-23 RX ADMIN — POTASSIUM CHLORIDE SCH MEQ: 200 INJECTION, SOLUTION INTRAVENOUS at 18:27

## 2022-08-23 RX ADMIN — Medication SCH TAB: at 09:00

## 2022-08-23 RX ADMIN — POTASSIUM CHLORIDE SCH MEQ: 200 INJECTION, SOLUTION INTRAVENOUS at 20:22

## 2022-08-23 RX ADMIN — Medication SCH MG: at 22:00

## 2022-08-23 RX ADMIN — POTASSIUM CHLORIDE SCH MEQ: 200 INJECTION, SOLUTION INTRAVENOUS at 22:40

## 2022-08-23 RX ADMIN — SODIUM CHLORIDE SCH MLS/HR: 9 INJECTION, SOLUTION INTRAVENOUS at 15:16

## 2022-08-23 RX ADMIN — FOLIC ACID SCH MG: 1 TABLET ORAL at 09:00

## 2022-08-23 RX ADMIN — Medication SCH ML: at 08:16

## 2022-08-23 RX ADMIN — MEGESTROL ACETATE SCH MG: 400 SUSPENSION ORAL at 09:12

## 2022-08-23 RX ADMIN — Medication SCH TAB: at 09:13

## 2022-08-23 RX ADMIN — POTASSIUM CHLORIDE SCH MEQ: 200 INJECTION, SOLUTION INTRAVENOUS at 11:54

## 2022-08-23 RX ADMIN — Medication SCH MG: at 09:00

## 2022-08-23 RX ADMIN — POTASSIUM CHLORIDE SCH MEQ: 200 INJECTION, SOLUTION INTRAVENOUS at 17:14

## 2022-08-23 RX ADMIN — Medication SCH MCG: at 09:00

## 2022-08-23 RX ADMIN — Medication SCH ML: at 12:10

## 2022-08-23 RX ADMIN — MEGESTROL ACETATE SCH MG: 400 SUSPENSION ORAL at 18:28

## 2022-08-23 RX ADMIN — MEGESTROL ACETATE SCH MG: 400 SUSPENSION ORAL at 09:00

## 2022-08-23 RX ADMIN — Medication SCH ML: at 18:26

## 2022-08-23 RX ADMIN — SODIUM CHLORIDE, SODIUM LACTATE, POTASSIUM CHLORIDE, AND CALCIUM CHLORIDE SCH MLS/HR: .6; .31; .03; .02 INJECTION, SOLUTION INTRAVENOUS at 16:14

## 2022-08-23 RX ADMIN — FOLIC ACID SCH MG: 1 TABLET ORAL at 09:13

## 2022-08-23 NOTE — NUR
MS RN CLOSING NOTES



PATIENT RESTING IN BED COMFORTABLY; A/OX2, BREATHING EVEN AND UNLABORED; TOLERATING ROOM AIR 
WELL; NO SOB NOTED; NO CONSENTS SIGNED; PATIENT FAMILY STILL DISCUSSING; SHANITA MIDLINE S/L, 
INTACT AND PATENT, FLUSHING WELL; NO S/S REDNESS OR INFILTRATION NOTED; TOLERATING IVF WELL; 
ALL NEEDS RENDERED; SAFETY PRECAUTIONS IMPLEMENTED; BED LOCKED IN LOW POSITION; SIDE 
RAILSX2, CALL LIGHT WITHIN REACH; WILL CONT PLAN OF CARE ENDORSE MADELYN TO ONCOMING SHIFT

## 2022-08-23 NOTE — NUR
RN MS NOTES

PT IN BED, RESTING, NO COMPLAINT AT THIS TIME, RESPIRATIONS NORMAL, CALL LIGHT WITHIN REACH, 
IV FLUIDS INFUSING WELL, PM MEDS GIVEN AS ORDERED, PM CARE PROVIDED, ALL  NEEDS ATTENDED.

## 2022-08-23 NOTE — NUR
RN MS notes

Offered meds to pt, but pt refused meds. Pt stated that she won't take meds, Explained to pt 
why meds were being given, also explained the  benefit of meds, but pt still refused to take 
meds.

## 2022-08-23 NOTE — NUR
RN MS NOTES

PT IN BED, ASLEEP, EASY TO AROUSE, ALERT AND VERBALLY RESPONSIVE, NO COMPLAINT AT THIS TIME, 
NOT IN DISTRESS, CALL LIGHT WITHIN REACH, NEEDS ATTENDED.

## 2022-08-23 NOTE — NUR
MS/TELE/RN

DURING INITIAL SHIFT ROUND, PATIENT WAS IN BED AWAKE, ALERT, ORIENTED, NO DISTRESS NOTED, 
CALL LIGHT IN REACH, FALL PRECAUTIONS PER PROTOCOL IMPLEMENTED, NEEDS ATTENDED, WILL 
MONITOR.

## 2022-08-24 VITALS — SYSTOLIC BLOOD PRESSURE: 127 MMHG | DIASTOLIC BLOOD PRESSURE: 47 MMHG

## 2022-08-24 VITALS — SYSTOLIC BLOOD PRESSURE: 162 MMHG | DIASTOLIC BLOOD PRESSURE: 71 MMHG

## 2022-08-24 VITALS — DIASTOLIC BLOOD PRESSURE: 71 MMHG | SYSTOLIC BLOOD PRESSURE: 162 MMHG

## 2022-08-24 LAB
BUN SERPL-MCNC: 10 MG/DL (ref 7–18)
CALCIUM SERPL-MCNC: 7.7 MG/DL (ref 8.5–10.1)
CHLORIDE SERPL-SCNC: 120 MMOL/L (ref 98–107)
CO2 SERPL-SCNC: 22 MMOL/L (ref 21–32)
CREAT SERPL-MCNC: 0.8 MG/DL (ref 0.6–1.3)
GLUCOSE SERPL-MCNC: 95 MG/DL (ref 74–106)
HCT VFR BLD AUTO: 27 % (ref 33–45)
HGB BLD-MCNC: 7.4 G/DL (ref 11.5–14.8)
POTASSIUM SERPL-SCNC: 3.9 MMOL/L (ref 3.5–5.1)
SODIUM SERPL-SCNC: 149 MMOL/L (ref 136–145)

## 2022-08-24 RX ADMIN — SODIUM CHLORIDE SCH MLS/HR: 9 INJECTION, SOLUTION INTRAVENOUS at 13:33

## 2022-08-24 RX ADMIN — POTASSIUM CHLORIDE SCH MEQ: 200 INJECTION, SOLUTION INTRAVENOUS at 00:23

## 2022-08-24 RX ADMIN — Medication SCH ML: at 08:28

## 2022-08-24 RX ADMIN — Medication SCH ML: at 12:02

## 2022-08-24 RX ADMIN — Medication SCH TAB: at 08:39

## 2022-08-24 RX ADMIN — SODIUM CHLORIDE, SODIUM LACTATE, POTASSIUM CHLORIDE, AND CALCIUM CHLORIDE SCH MLS/HR: .6; .31; .03; .02 INJECTION, SOLUTION INTRAVENOUS at 02:42

## 2022-08-24 RX ADMIN — Medication SCH MCG: at 08:39

## 2022-08-24 RX ADMIN — Medication SCH MG: at 08:39

## 2022-08-24 RX ADMIN — MEGESTROL ACETATE SCH MG: 400 SUSPENSION ORAL at 08:38

## 2022-08-24 RX ADMIN — METOPROLOL SUCCINATE SCH MG: 50 TABLET, EXTENDED RELEASE ORAL at 08:40

## 2022-08-24 RX ADMIN — FOLIC ACID SCH MG: 1 TABLET ORAL at 08:38

## 2022-08-24 RX ADMIN — SODIUM CHLORIDE, SODIUM LACTATE, POTASSIUM CHLORIDE, AND CALCIUM CHLORIDE SCH MLS/HR: .6; .31; .03; .02 INJECTION, SOLUTION INTRAVENOUS at 15:01

## 2022-08-24 NOTE — NUR
RN OPENING NOTE



RECEIVED PATIENT REPORT. PATIENT CURRENTLY IN BED RESTING. ON 2 LITERS SUPPLEMENTAL OXYGEN 
VIA NASAL CANULA, OXYGEN SATURATION IN THE HIGH 90S. MEDICAL SURGICAL STATUS NOTED. PATIENT 
IS ALERT AND ORIENTED TIMES 1, KNOWS HER NAME BUT UNABLE TO ANSWER QUESTIONS OR PROVIDE 
MEANINGFUL INSIGHT INTO HEALTH CONDITION. DIAPER NOTED. IV ACCESS ON LEFT UPPER ARM MIDLINE 
18 GAUGE, FLUSHING EASILY WITH NO RESISTANCE. SAFETY MEASURES IMPLEMENTED, BED IN LOWEST 
LOCKED POSITION, SIDE RAILS UP TIMES 2, CALL LIGHT WITHIN REACH. WILL CONTINUE PLAN OF CARE 
AND ANTICIPATE NEEDS.

## 2022-08-24 NOTE — NUR
MS/TELE/RN

PATIENT IS SLEEPING, NO SIGNS OF DISTRESS NOTED, CALL LIGHT IN REACH. WILL CONTINUE TO 
MONITOR.

## 2022-08-24 NOTE — NUR
MS/TELE/RN

PATIENT IS AWAKE, NO SIGNS OF DISTRESS NOTED, IVF INFUSING WELL, ALL NEEDS ATTENDED AT THIS 
TIME, WILL CONTINUE TO MONITOR.

## 2022-08-24 NOTE — NUR
PATIENT HAS BEEN TRANSFERRED TO ANOTHER FACILITY TO OBTAIN A HIGHER LEVEL OF CARE. IV ACCESS 
REMAINS ON RIGHT UPPER ARM MIDLINE. BELONGINGS LIST SIGNED AND ALL BELONGINGS ACCOUNTED FOR. 
DISCHARGED PAPERWORK SIGNED AND FILED IN PHYSICAL CHART. HAND OFF REPORT GIVEN TO AMBULANCE 
CREW. NEXT OF KIN NOTIFIED OF TRANSFER. PATIENT LEFT HOSPITAL GROUNDS IN STABLE CONDITION 
VIA PRIVATE AMBULANCE.